# Patient Record
Sex: FEMALE | Race: BLACK OR AFRICAN AMERICAN | NOT HISPANIC OR LATINO | Employment: UNEMPLOYED | URBAN - METROPOLITAN AREA
[De-identification: names, ages, dates, MRNs, and addresses within clinical notes are randomized per-mention and may not be internally consistent; named-entity substitution may affect disease eponyms.]

---

## 2023-10-19 PROBLEM — F32.A DEPRESSION: Status: ACTIVE | Noted: 2023-10-19

## 2023-10-19 PROBLEM — F41.9 ANXIETY: Status: ACTIVE | Noted: 2023-10-19

## 2023-10-19 PROBLEM — R32 INCONTINENCE: Status: ACTIVE | Noted: 2023-10-19

## 2023-10-19 PROBLEM — E55.9 VITAMIN D DEFICIENCY: Status: ACTIVE | Noted: 2023-10-19

## 2023-10-19 PROBLEM — E66.9 OBESITY: Status: ACTIVE | Noted: 2023-10-19

## 2023-10-19 PROBLEM — F32.1: Status: ACTIVE | Noted: 2023-10-19

## 2023-10-19 PROBLEM — L81.9 HYPERPIGMENTED SKIN LESION: Status: ACTIVE | Noted: 2023-10-19

## 2023-10-19 RX ORDER — POLYETHYLENE GLYCOL 3350 17 G/17G
17 POWDER, FOR SOLUTION ORAL DAILY
COMMUNITY
Start: 2022-09-29 | End: 2024-02-26 | Stop reason: WASHOUT

## 2023-10-19 RX ORDER — PRENATAL VIT 49/IRON FUM/FOLIC 6.75-0.2MG
TABLET ORAL
COMMUNITY
End: 2024-02-26 | Stop reason: WASHOUT

## 2023-10-19 RX ORDER — CEFTRIAXONE 500 MG/1
INJECTION, POWDER, FOR SOLUTION INTRAMUSCULAR; INTRAVENOUS
COMMUNITY
Start: 2022-10-31 | End: 2024-02-26 | Stop reason: WASHOUT

## 2023-10-19 RX ORDER — AZITHROMYCIN 500 MG/1
2 TABLET, FILM COATED ORAL
COMMUNITY
Start: 2022-10-28 | End: 2024-02-26 | Stop reason: WASHOUT

## 2024-02-04 ENCOUNTER — APPOINTMENT (OUTPATIENT)
Dept: RADIOLOGY | Facility: HOSPITAL | Age: 24
End: 2024-02-04
Payer: COMMERCIAL

## 2024-02-04 ENCOUNTER — HOSPITAL ENCOUNTER (EMERGENCY)
Facility: HOSPITAL | Age: 24
Discharge: HOME | End: 2024-02-04
Payer: COMMERCIAL

## 2024-02-04 VITALS
TEMPERATURE: 98.5 F | WEIGHT: 200 LBS | HEIGHT: 63 IN | BODY MASS INDEX: 35.44 KG/M2 | DIASTOLIC BLOOD PRESSURE: 76 MMHG | OXYGEN SATURATION: 98 % | RESPIRATION RATE: 18 BRPM | SYSTOLIC BLOOD PRESSURE: 122 MMHG | HEART RATE: 81 BPM

## 2024-02-04 DIAGNOSIS — N76.0 BV (BACTERIAL VAGINOSIS): ICD-10-CM

## 2024-02-04 DIAGNOSIS — B96.89 BV (BACTERIAL VAGINOSIS): ICD-10-CM

## 2024-02-04 DIAGNOSIS — Z3A.01 LESS THAN 8 WEEKS GESTATION OF PREGNANCY (HHS-HCC): Primary | ICD-10-CM

## 2024-02-04 LAB
ABO GROUP (TYPE) IN BLOOD: NORMAL
ALBUMIN SERPL BCP-MCNC: 4.2 G/DL (ref 3.4–5)
ALP SERPL-CCNC: 68 U/L (ref 33–110)
ALT SERPL W P-5'-P-CCNC: 16 U/L (ref 7–45)
ANION GAP SERPL CALC-SCNC: 12 MMOL/L (ref 10–20)
ANTIBODY SCREEN: NORMAL
APPEARANCE UR: CLEAR
AST SERPL W P-5'-P-CCNC: 14 U/L (ref 9–39)
B-HCG SERPL-ACNC: 3174 MIU/ML
BASOPHILS # BLD AUTO: 0.04 X10*3/UL (ref 0–0.1)
BASOPHILS NFR BLD AUTO: 0.5 %
BILIRUB SERPL-MCNC: 0.6 MG/DL (ref 0–1.2)
BILIRUB UR STRIP.AUTO-MCNC: NEGATIVE MG/DL
BUN SERPL-MCNC: 7 MG/DL (ref 6–23)
CALCIUM SERPL-MCNC: 9.8 MG/DL (ref 8.6–10.6)
CHLORIDE SERPL-SCNC: 104 MMOL/L (ref 98–107)
CLUE CELLS SPEC QL WET PREP: PRESENT
CO2 SERPL-SCNC: 24 MMOL/L (ref 21–32)
COLOR UR: YELLOW
CREAT SERPL-MCNC: 0.83 MG/DL (ref 0.5–1.05)
EGFRCR SERPLBLD CKD-EPI 2021: >90 ML/MIN/1.73M*2
EOSINOPHIL # BLD AUTO: 0.23 X10*3/UL (ref 0–0.7)
EOSINOPHIL NFR BLD AUTO: 3 %
ERYTHROCYTE [DISTWIDTH] IN BLOOD BY AUTOMATED COUNT: 14.6 % (ref 11.5–14.5)
GLUCOSE SERPL-MCNC: 98 MG/DL (ref 74–99)
GLUCOSE UR STRIP.AUTO-MCNC: NEGATIVE MG/DL
HCT VFR BLD AUTO: 37.4 % (ref 36–46)
HGB BLD-MCNC: 12.6 G/DL (ref 12–16)
IMM GRANULOCYTES # BLD AUTO: 0.03 X10*3/UL (ref 0–0.7)
IMM GRANULOCYTES NFR BLD AUTO: 0.4 % (ref 0–0.9)
KETONES UR STRIP.AUTO-MCNC: ABNORMAL MG/DL
LEUKOCYTE ESTERASE UR QL STRIP.AUTO: ABNORMAL
LYMPHOCYTES # BLD AUTO: 3.37 X10*3/UL (ref 1.2–4.8)
LYMPHOCYTES NFR BLD AUTO: 44.1 %
MCH RBC QN AUTO: 26 PG (ref 26–34)
MCHC RBC AUTO-ENTMCNC: 33.7 G/DL (ref 32–36)
MCV RBC AUTO: 77 FL (ref 80–100)
MONOCYTES # BLD AUTO: 0.52 X10*3/UL (ref 0.1–1)
MONOCYTES NFR BLD AUTO: 6.8 %
MUCOUS THREADS #/AREA URNS AUTO: NORMAL /LPF
NEUTROPHILS # BLD AUTO: 3.45 X10*3/UL (ref 1.2–7.7)
NEUTROPHILS NFR BLD AUTO: 45.2 %
NITRITE UR QL STRIP.AUTO: NEGATIVE
NRBC BLD-RTO: 0 /100 WBCS (ref 0–0)
PH UR STRIP.AUTO: 5 [PH]
PLATELET # BLD AUTO: 358 X10*3/UL (ref 150–450)
POTASSIUM SERPL-SCNC: 3.6 MMOL/L (ref 3.5–5.3)
PREGNANCY TEST URINE, POC: POSITIVE
PROT SERPL-MCNC: 7.4 G/DL (ref 6.4–8.2)
PROT UR STRIP.AUTO-MCNC: NEGATIVE MG/DL
RBC # BLD AUTO: 4.84 X10*6/UL (ref 4–5.2)
RBC # UR STRIP.AUTO: NEGATIVE /UL
RBC #/AREA URNS AUTO: NORMAL /HPF
RH FACTOR (ANTIGEN D): NORMAL
SODIUM SERPL-SCNC: 136 MMOL/L (ref 136–145)
SP GR UR STRIP.AUTO: 1.02
SQUAMOUS #/AREA URNS AUTO: NORMAL /HPF
T VAGINALIS SPEC QL WET PREP: ABNORMAL
TRICHOMONAS REFLEX COMMENT: ABNORMAL
UROBILINOGEN UR STRIP.AUTO-MCNC: 2 MG/DL
WBC # BLD AUTO: 7.6 X10*3/UL (ref 4.4–11.3)
WBC #/AREA URNS AUTO: NORMAL /HPF
WBC VAG QL WET PREP: ABNORMAL
YEAST VAG QL WET PREP: ABNORMAL

## 2024-02-04 PROCEDURE — 87800 DETECT AGNT MULT DNA DIREC: CPT | Performed by: PHYSICIAN ASSISTANT

## 2024-02-04 PROCEDURE — 99284 EMERGENCY DEPT VISIT MOD MDM: CPT | Mod: 25

## 2024-02-04 PROCEDURE — 85025 COMPLETE CBC W/AUTO DIFF WBC: CPT | Performed by: PHYSICIAN ASSISTANT

## 2024-02-04 PROCEDURE — 81001 URINALYSIS AUTO W/SCOPE: CPT | Performed by: PHYSICIAN ASSISTANT

## 2024-02-04 PROCEDURE — 80053 COMPREHEN METABOLIC PANEL: CPT | Performed by: PHYSICIAN ASSISTANT

## 2024-02-04 PROCEDURE — 87210 SMEAR WET MOUNT SALINE/INK: CPT | Mod: 59 | Performed by: PHYSICIAN ASSISTANT

## 2024-02-04 PROCEDURE — 86901 BLOOD TYPING SEROLOGIC RH(D): CPT | Performed by: PHYSICIAN ASSISTANT

## 2024-02-04 PROCEDURE — 84702 CHORIONIC GONADOTROPIN TEST: CPT | Performed by: PHYSICIAN ASSISTANT

## 2024-02-04 PROCEDURE — 87661 TRICHOMONAS VAGINALIS AMPLIF: CPT | Mod: 59 | Performed by: PHYSICIAN ASSISTANT

## 2024-02-04 PROCEDURE — 87086 URINE CULTURE/COLONY COUNT: CPT | Performed by: PHYSICIAN ASSISTANT

## 2024-02-04 PROCEDURE — 99285 EMERGENCY DEPT VISIT HI MDM: CPT | Performed by: PHYSICIAN ASSISTANT

## 2024-02-04 PROCEDURE — 36415 COLL VENOUS BLD VENIPUNCTURE: CPT | Performed by: PHYSICIAN ASSISTANT

## 2024-02-04 PROCEDURE — 76830 TRANSVAGINAL US NON-OB: CPT

## 2024-02-04 RX ORDER — ACETAMINOPHEN 325 MG/1
650 TABLET ORAL EVERY 6 HOURS PRN
Qty: 20 TABLET | Refills: 0 | Status: SHIPPED | OUTPATIENT
Start: 2024-02-04 | End: 2024-02-09

## 2024-02-04 RX ORDER — B-COMPLEX WITH VITAMIN C
1 TABLET ORAL DAILY
Qty: 30 TABLET | Refills: 0 | Status: SHIPPED | OUTPATIENT
Start: 2024-02-04 | End: 2024-03-05

## 2024-02-04 RX ORDER — ACETAMINOPHEN 325 MG/1
975 TABLET ORAL ONCE
Status: COMPLETED | OUTPATIENT
Start: 2024-02-04 | End: 2024-02-04

## 2024-02-04 RX ORDER — METRONIDAZOLE 500 MG/1
500 TABLET ORAL 2 TIMES DAILY
Qty: 14 TABLET | Refills: 0 | Status: SHIPPED | OUTPATIENT
Start: 2024-02-04 | End: 2024-02-11

## 2024-02-04 RX ADMIN — ACETAMINOPHEN 975 MG: 325 TABLET ORAL at 16:24

## 2024-02-04 ASSESSMENT — COLUMBIA-SUICIDE SEVERITY RATING SCALE - C-SSRS
1. IN THE PAST MONTH, HAVE YOU WISHED YOU WERE DEAD OR WISHED YOU COULD GO TO SLEEP AND NOT WAKE UP?: NO
6. HAVE YOU EVER DONE ANYTHING, STARTED TO DO ANYTHING, OR PREPARED TO DO ANYTHING TO END YOUR LIFE?: NO
2. HAVE YOU ACTUALLY HAD ANY THOUGHTS OF KILLING YOURSELF?: NO

## 2024-02-04 ASSESSMENT — PAIN - FUNCTIONAL ASSESSMENT: PAIN_FUNCTIONAL_ASSESSMENT: 0-10

## 2024-02-04 ASSESSMENT — PAIN DESCRIPTION - LOCATION: LOCATION: ABDOMEN

## 2024-02-04 NOTE — ED PROVIDER NOTES
"This is a 23-year-old pregnant female who presents to the ED with flank pain in pregnancy has been present for the past 2 to 3 days.  She states that she took a pregnancy test on 1/30 and found to be positive, she states that later on she developed flank pain as well as dysuria, urinary frequency and urgency.  She also endorses having some vaginal burning.  She denies any vaginal discharge or bleeding.  She denies any abdominal pain, nausea, vomiting, chest pain, shortness of breath, fevers or chills.  She states otherwise she has been in her normal state of health.  She has not yet seen OB/GYN for this pregnancy and has not yet had an ultrasound.  She is unsure of the exact date of her last menstrual cycle but believes it was in December.  She did not take anything at home for her symptoms.  She is G2, P1.      History provided by:  Patient   used: No             Visit Vitals  /76   Pulse 81   Temp 36.9 °C (98.5 °F)   Resp 18   Ht 1.6 m (5' 3\")   Wt 90.7 kg (200 lb)   SpO2 98%   BMI 35.43 kg/m²   BSA 2.01 m²          Physical Exam     Physical exam:    General: Vitals noted, no distress. Afebrile.    EENT: PERRL, EOM's intact. Eye lids without lesions. No scleral icterus. Normal phonation. Nares patent. MMM.     Cardiac: Regular, rate, rhythm, no murmur.    Pulmonary: Lungs clear bilaterally with good aeration. No adventitious breath sounds.    Abdomen: Nontender, soft, nonsurgical. No peritoneal signs. Normoactive bowel sounds.  Mild right-sided CVA tenderness to palpation.  No left-sided CVA tenderness.    Pelvic Exam: Chaperone present. External genitalia normal. No rashes or lesions. Speculum exam shows no lesions on the cervix.  Small amount of thick white discharge within the vagina. no bleeding. Closed cervical os. Bimanual exam shows no adnexal pain or mass. No cervical motion tenderness.    Extremities: No peripheral edema. Full range of motion. Moves all extremities freely. "     Skin: No rash. Warm and dry. No discoloration noted.     Neuro: No focal neurologic deficits noted.  Pt is A&O x3, speech is clear, moves all extremities independently sensation is intact.          Labs Reviewed - No data to display    No orders to display         ED Course & MDM     Medical Decision Making  This is a 23-year-old pregnant female who presents to the ED with flank pain in pregnancy with associated urinary symptoms.  Vital stable upon arrival to the ED.  On physical examination she is overall well-appearing.  Abdomen soft and nontender.  She did have mild right-sided CVA tenderness.  No rashes or lesions to her flanks.  Pelvic examination performed chaperone present which showed a small amount of thick white discharge within the vagina.  No cervical motion tenderness.  No adnexal pain or masses palpated.  Wet prep as well as GC and chlamydia swabs obtained during pelvic examination.  IV established laboratory studies obtained.  Urinalysis ordered.  Urine pregnancy test was positive today.  Transvaginal ultrasound ordered to evaluate location and age of the pregnancy.  She was medicated Tylenol for her pain.  On reevaluation she was feeling improved.  Urinalysis positive for leukocyte Estrace but was otherwise grossly unremarkable and not concerning for UTI.  Urine was sent for culture.  Wet prep positive for clue cells.  Beta quant 3174, CMP and CBC both grossly unremarkable.  She is Rh+ so she does not require RhoGAM today.  Transvaginal ultrasound did show a IUP consistent with 5 weeks 2 days, and no fetal cardiac activity noted, however this could be due to early gestational age.  There was also an area that was concerning for possible small subchorionic hemorrhage.  I discussed the results with the patient.  She was advised to follow-up with OB/GYN within the next 2 weeks.  She was given signs and symptoms that she should return to the ED with.  She was feeling improved at this time and was  given prescriptions for Tylenol for her pain, prenatal vitamins for the pregnancy as well as Flagyl for the bacterial vaginosis.  She was provided with OB/GYN clinic follow-up instructions and was discharged from the ED in stable condition.    Amount and/or Complexity of Data Reviewed  Labs: ordered.  Radiology: ordered. Decision-making details documented in ED Course.    Risk  Prescription drug management.         ED Course as of 02/04/24 1747   Sun Feb 04, 2024   1731 US pelvis transvaginal  On my interpretation IUP is visualized within the uterus [AW]      ED Course User Index  [AW] Chey Humphries PA-C         Diagnoses as of 02/04/24 1747   Less than 8 weeks gestation of pregnancy   BV (bacterial vaginosis)       Procedures    ANNETTE Kent, BRANDYN Humphries PA-C  02/04/24 1749

## 2024-02-05 LAB
BACTERIA UR CULT: NORMAL
C TRACH RRNA SPEC QL NAA+PROBE: POSITIVE
HOLD SPECIMEN: NORMAL
N GONORRHOEA DNA SPEC QL PROBE+SIG AMP: NEGATIVE
T VAGINALIS RRNA SPEC QL NAA+PROBE: NEGATIVE

## 2024-02-25 PROBLEM — A74.9 CHLAMYDIA: Status: ACTIVE | Noted: 2024-02-25

## 2024-02-25 PROBLEM — F32.1: Status: RESOLVED | Noted: 2023-10-19 | Resolved: 2024-02-25

## 2024-02-25 PROBLEM — L81.9 HYPERPIGMENTED SKIN LESION: Status: RESOLVED | Noted: 2023-10-19 | Resolved: 2024-02-25

## 2024-02-25 NOTE — PROGRESS NOTES
Subjective   Della Oconnell is a 23 y.o.  at 8w3d with a working estimated date of delivery of 10/4/2024, by 5w gestational sca Ultrasound who presents for an initial prenatal visit.     Patient currently experiencing:  nausea, eating helps, declines medication at this time   Bleeding or cramping since LMP: no    Ultrasound completed this pregnancy: Yes - 24    Taking prenatal vitamin: No, would like gummy's     Last pap: 22 WNL at [facility]    Postpartum Depression: Not on file        OB History    Para Term  AB Living   3 1 1   1 1   SAB IAB Ectopic Multiple Live Births   1       1      # Outcome Date GA Lbr Michele/2nd Weight Sex Delivery Anes PTL Lv   3 Current            2 Term 23 38w5d  2.495 kg F Vag-Spont EPI  TORRES      Birth Comments: 1.3LPPH requiring IV TXA x1, IM hemabate x1, buccal cytotec x1 as well as Reyna      Complications: Hemorrhage, Preeclampsia   1 SAB              Prior pregnancy complications:  PPH, pre-e with SF  History of hypertension:  Yes, preeclampsia    Past Medical History:   Diagnosis Date    Chlamydia     History of pre-eclampsia     Hx of gonorrhea       History reviewed. No pertinent surgical history.   Social History     Tobacco Use    Smoking status: Former     Types: Cigars    Smokeless tobacco: Never   Vaping Use    Vaping Use: Former   Substance Use Topics    Alcohol use: Not Currently    Drug use: Not Currently     Types: Marijuana        Objective   Physical Exam  Weight: 101 kg (223 lb 3.2 oz)  Pregravid BMI: 37.21  BP: 136/76 (Pluse-69)    Physical Exam  Constitutional:       Appearance: Normal appearance.   HENT:      Head: Normocephalic.   Cardiovascular:      Rate and Rhythm: Normal rate and regular rhythm.      Pulses: Normal pulses.      Heart sounds: Normal heart sounds.   Pulmonary:      Effort: Pulmonary effort is normal.      Breath sounds: Normal breath sounds.   Skin:     General: Skin is warm and dry.   Neurological:      Mental Status: She is  alert.   Psychiatric:         Mood and Affect: Mood normal.         Behavior: Behavior normal.         Thought Content: Thought content normal.         Judgment: Judgment normal.         Problem List Items Addressed This Visit       Chlamydia    Overview     +2/4/24 ; patient reports ED didn't tell/treat her   Treated at new OB, EPT treatment sent as well   RADHA next visit           Relevant Medications    azithromycin (Zithromax) 500 mg tablet    Supervision of other normal pregnancy, antepartum - Primary    Overview     Desired provider in labor: [] CNM  [] Physician  [] Dated by:   [x] Initial BMI: 37  [] Prenatal Labs:   [] Rh status:  [] Genetic Screening:    [] Baby ASA    [] Anatomy US:  [] Fetal Sex:   [] Patient added to BirthTracks  [] 1hr GCT at 24-28wks:  [] 3 hr GTT (if indicated):  [] Rhogam (if indicated):   [] Fetal Surveillance (if indicated):    [] Tdap (27-36wks):  [] Flu Shot:  [] COVID vaccine:     [] Breastfeeding:  [] Pain management during labor:   [] Postpartum Birth control method:   [] Labor Support:   [] GBS at 36 wks:               Relevant Medications    azithromycin (Zithromax) 500 mg tablet    PNV62-FA-om3-dha-epa-fish oil (Prenatal Gummy) 400 mcg-35 mg -25 mg-5 mg tablet,chewable    Other Relevant Orders    Comprehensive Metabolic Panel    Protein, Urine Random    Uric Acid    History of pre-eclampsia    Overview     With SF with 2023 birth  HELLP labs ordered at new OB  [ ] ASA 81mg at 12w          BMI 37.0-37.9, adult    Overview     BMI = 37.21 at NOB  Fetal surveillance BMI 35-39.9 at NOB:  Growth US at 30 and 36 wks  BPP or NST weekly 37 wks to delivery    Timing of delivery: 39 0/7 - 39 6/7 wks  *BMI >= 50 at any time in pregnancy: Deliver at Level 4            Other Visit Diagnoses       8 weeks gestation of pregnancy                Plan   NOB plan: New OB resources provided and reviewed with particular attention to dietary, travel, and medication restrictions  Oriented to  practice, CNM vs. MD care  Reviewed IOM recommendations for weight gain given pt's BMI: 11-20 pounds (BMI greater than or equal to 30)  Reviewed bleeding precautions, warning signs, when to call provider; phone number provided  Discussed bASA for PEC prophylaxis  The following Rx were sent to pharmacy: PNV,    Routine NOB labs ordered  Additional labs added: JONATHAN  Discussed Centering Pregnancy with patient, interested, enrolled in [facility] group, [phone]  Dating ultrasound ordered  Return in 4 weeks for routine prenatal care  Urine culture sent as part of labs for asymptomatic screening only   Reviewed reasons to call CNM on-call: vaginal bleeding, strong pelvic pain, or any questions/concerns  *next visit: genetics, ASA, flu, mood     CHANTALE Donahue

## 2024-02-26 ENCOUNTER — INITIAL PRENATAL (OUTPATIENT)
Dept: OBSTETRICS AND GYNECOLOGY | Facility: CLINIC | Age: 24
End: 2024-02-26
Payer: COMMERCIAL

## 2024-02-26 ENCOUNTER — LAB (OUTPATIENT)
Dept: LAB | Facility: LAB | Age: 24
End: 2024-02-26
Payer: COMMERCIAL

## 2024-02-26 VITALS — WEIGHT: 223.2 LBS | DIASTOLIC BLOOD PRESSURE: 76 MMHG | SYSTOLIC BLOOD PRESSURE: 136 MMHG | BODY MASS INDEX: 39.54 KG/M2

## 2024-02-26 DIAGNOSIS — Z34.80 SUPERVISION OF OTHER NORMAL PREGNANCY, ANTEPARTUM (HHS-HCC): ICD-10-CM

## 2024-02-26 DIAGNOSIS — Z3A.08 8 WEEKS GESTATION OF PREGNANCY (HHS-HCC): ICD-10-CM

## 2024-02-26 DIAGNOSIS — Z87.59 HISTORY OF PRE-ECLAMPSIA: ICD-10-CM

## 2024-02-26 DIAGNOSIS — Z34.00 SUPERVISION OF NORMAL FIRST PREGNANCY, ANTEPARTUM (HHS-HCC): ICD-10-CM

## 2024-02-26 DIAGNOSIS — A74.9 CHLAMYDIA: ICD-10-CM

## 2024-02-26 DIAGNOSIS — Z34.80 SUPERVISION OF OTHER NORMAL PREGNANCY, ANTEPARTUM (HHS-HCC): Primary | ICD-10-CM

## 2024-02-26 PROBLEM — E66.9 OBESITY: Status: RESOLVED | Noted: 2023-10-19 | Resolved: 2024-02-26

## 2024-02-26 PROBLEM — R32 INCONTINENCE: Status: RESOLVED | Noted: 2023-10-19 | Resolved: 2024-02-26

## 2024-02-26 PROCEDURE — 87340 HEPATITIS B SURFACE AG IA: CPT

## 2024-02-26 PROCEDURE — 99214 OFFICE O/P EST MOD 30 MIN: CPT | Performed by: ADVANCED PRACTICE MIDWIFE

## 2024-02-26 PROCEDURE — 80053 COMPREHEN METABOLIC PANEL: CPT

## 2024-02-26 PROCEDURE — 87086 URINE CULTURE/COLONY COUNT: CPT | Performed by: ADVANCED PRACTICE MIDWIFE

## 2024-02-26 PROCEDURE — 86787 VARICELLA-ZOSTER ANTIBODY: CPT

## 2024-02-26 PROCEDURE — 85027 COMPLETE CBC AUTOMATED: CPT

## 2024-02-26 PROCEDURE — 86317 IMMUNOASSAY INFECTIOUS AGENT: CPT

## 2024-02-26 PROCEDURE — 86901 BLOOD TYPING SEROLOGIC RH(D): CPT

## 2024-02-26 PROCEDURE — 87800 DETECT AGNT MULT DNA DIREC: CPT | Performed by: ADVANCED PRACTICE MIDWIFE

## 2024-02-26 PROCEDURE — 86900 BLOOD TYPING SEROLOGIC ABO: CPT

## 2024-02-26 PROCEDURE — 83020 HEMOGLOBIN ELECTROPHORESIS: CPT | Performed by: ADVANCED PRACTICE MIDWIFE

## 2024-02-26 PROCEDURE — 86780 TREPONEMA PALLIDUM: CPT

## 2024-02-26 PROCEDURE — 86803 HEPATITIS C AB TEST: CPT

## 2024-02-26 PROCEDURE — 84550 ASSAY OF BLOOD/URIC ACID: CPT

## 2024-02-26 PROCEDURE — 86850 RBC ANTIBODY SCREEN: CPT

## 2024-02-26 PROCEDURE — 83021 HEMOGLOBIN CHROMOTOGRAPHY: CPT

## 2024-02-26 PROCEDURE — 83036 HEMOGLOBIN GLYCOSYLATED A1C: CPT

## 2024-02-26 PROCEDURE — 87389 HIV-1 AG W/HIV-1&-2 AB AG IA: CPT

## 2024-02-26 PROCEDURE — 87661 TRICHOMONAS VAGINALIS AMPLIF: CPT | Mod: 59 | Performed by: ADVANCED PRACTICE MIDWIFE

## 2024-02-26 PROCEDURE — 36415 COLL VENOUS BLD VENIPUNCTURE: CPT

## 2024-02-26 RX ORDER — AZITHROMYCIN 500 MG/1
1000 TABLET, FILM COATED ORAL ONCE
Qty: 2 TABLET | Refills: 0 | Status: SHIPPED | OUTPATIENT
Start: 2024-02-26 | End: 2024-02-26

## 2024-02-26 ASSESSMENT — ENCOUNTER SYMPTOMS
NEUROLOGICAL NEGATIVE: 0
CARDIOVASCULAR NEGATIVE: 0
ALLERGIC/IMMUNOLOGIC NEGATIVE: 0
GASTROINTESTINAL NEGATIVE: 0
ENDOCRINE NEGATIVE: 0
HEMATOLOGIC/LYMPHATIC NEGATIVE: 0
CONSTITUTIONAL NEGATIVE: 0
RESPIRATORY NEGATIVE: 0
EYES NEGATIVE: 0
MUSCULOSKELETAL NEGATIVE: 0
PSYCHIATRIC NEGATIVE: 0

## 2024-02-27 LAB
ABO GROUP (TYPE) IN BLOOD: NORMAL
ALBUMIN SERPL BCP-MCNC: 4.2 G/DL (ref 3.4–5)
ALP SERPL-CCNC: 65 U/L (ref 33–110)
ALT SERPL W P-5'-P-CCNC: 17 U/L (ref 7–45)
ANION GAP SERPL CALC-SCNC: 12 MMOL/L (ref 10–20)
ANTIBODY SCREEN: NORMAL
AST SERPL W P-5'-P-CCNC: 11 U/L (ref 9–39)
BACTERIA UR CULT: NO GROWTH
BILIRUB SERPL-MCNC: 0.4 MG/DL (ref 0–1.2)
BUN SERPL-MCNC: 10 MG/DL (ref 6–23)
C TRACH RRNA SPEC QL NAA+PROBE: POSITIVE
CALCIUM SERPL-MCNC: 9.4 MG/DL (ref 8.6–10.6)
CHLORIDE SERPL-SCNC: 104 MMOL/L (ref 98–107)
CO2 SERPL-SCNC: 23 MMOL/L (ref 21–32)
CREAT SERPL-MCNC: 0.68 MG/DL (ref 0.5–1.05)
EGFRCR SERPLBLD CKD-EPI 2021: >90 ML/MIN/1.73M*2
ERYTHROCYTE [DISTWIDTH] IN BLOOD BY AUTOMATED COUNT: 15.7 % (ref 11.5–14.5)
EST. AVERAGE GLUCOSE BLD GHB EST-MCNC: 103 MG/DL
GLUCOSE SERPL-MCNC: 78 MG/DL (ref 74–99)
HBA1C MFR BLD: 5.2 %
HBV SURFACE AG SERPL QL IA: NONREACTIVE
HCT VFR BLD AUTO: 39.5 % (ref 36–46)
HCV AB SER QL: NONREACTIVE
HGB BLD-MCNC: 12.3 G/DL (ref 12–16)
HIV 1+2 AB+HIV1 P24 AG SERPL QL IA: NONREACTIVE
MCH RBC QN AUTO: 25.7 PG (ref 26–34)
MCHC RBC AUTO-ENTMCNC: 31.1 G/DL (ref 32–36)
MCV RBC AUTO: 83 FL (ref 80–100)
N GONORRHOEA DNA SPEC QL PROBE+SIG AMP: NEGATIVE
NRBC BLD-RTO: 0 /100 WBCS (ref 0–0)
PLATELET # BLD AUTO: 330 X10*3/UL (ref 150–450)
POTASSIUM SERPL-SCNC: 4.2 MMOL/L (ref 3.5–5.3)
PROT SERPL-MCNC: 6.8 G/DL (ref 6.4–8.2)
RBC # BLD AUTO: 4.79 X10*6/UL (ref 4–5.2)
REFLEX ADDED, ANEMIA PANEL: NORMAL
RH FACTOR (ANTIGEN D): NORMAL
RUBV IGG SERPL IA-ACNC: 3.3 IA
RUBV IGG SERPL QL IA: POSITIVE
SODIUM SERPL-SCNC: 135 MMOL/L (ref 136–145)
T VAGINALIS RRNA SPEC QL NAA+PROBE: NEGATIVE
TREPONEMA PALLIDUM IGG+IGM AB [PRESENCE] IN SERUM OR PLASMA BY IMMUNOASSAY: NONREACTIVE
URATE SERPL-MCNC: 4.2 MG/DL (ref 2.3–6.7)
VARICELLA ZOSTER IGG INDEX: 1.6 IA
VZV IGG SER QL IA: POSITIVE
WBC # BLD AUTO: 8.4 X10*3/UL (ref 4.4–11.3)

## 2024-02-28 ENCOUNTER — TELEPHONE (OUTPATIENT)
Dept: OBSTETRICS AND GYNECOLOGY | Facility: CLINIC | Age: 24
End: 2024-02-28

## 2024-02-28 LAB
HEMOGLOBIN A2: 2.8 % (ref 2–3.5)
HEMOGLOBIN A: 96.8 % (ref 95.8–98)
HEMOGLOBIN F: 0.4 % (ref 0–2)
HEMOGLOBIN IDENTIFICATION INTERPRETATION: NORMAL
PATH REVIEW-HGB IDENTIFICATION: NORMAL

## 2024-03-21 NOTE — PROGRESS NOTES
Subjective   Della Oconnell is a 23 y.o.  at 13w3d with a working estimated date of delivery of 10/4/2024, by Ultrasound who presents for a routine prenatal visit.     She denies vaginal bleeding, abdominal pain, or leakage of fluid. Patient denies vision changes. Patient has irregular daily headaches. She lays down and the headaches go away. Moodwise patient doing fine.     Just came from US. She reports dating was adjusted (report not visible yet)    Objective   Physical Exam  Weight: 102 kg (224 lb 12.8 oz), Pregravid BMI: 37.21  Expected Total Weight Gain: 5 kg (11 lb)-9 kg (19 lb)   BP: 144/85 (Pluse-87)     Repeat 123    Problem List Items Addressed This Visit       Chlamydia    Overview     +24 ; patient reports ED didn't tell/treat her   + at new OB   Treated at new OB, EPT treatment sent as well   RADHA :            Supervision of other normal pregnancy, antepartum    Overview       Desired provider in labor: [] CNM  [] Physician  [] Dated by:   [x] Initial BMI: 37  [x] Prenatal Labs: WNL except for +chlamydia  [x] Rh status: B+  [x] Genetic Screening:  ordered 24  [x] Baby ASA: rx sent at 13w    [] Anatomy US:  [] Fetal Sex:   [] Patient added to BirthTracks  [] 1hr GCT at 24-28wks:  [] 3 hr GTT (if indicated):  [] Fetal Surveillance (if indicated):    [] Tdap (27-36wks):  [x] Flu Shot: declines 24  [] COVID vaccine:     [] Breastfeeding:  [] Pain management during labor:   [] Postpartum Birth control method:   [] Labor Support:   [] GBS at 36 wks:               History of pre-eclampsia    Overview     With SF with  birth  HELLP labs WNL at new OB ; p/c collected at 13w  [ x] ASA 81mg at 12w   13w: 144/85 --> repeat 123/, reviewed with Dr Perdomo, will continue to monitor closely             Other Visit Diagnoses       13 weeks gestation of pregnancy    -  Primary    Relevant Medications    aspirin 81 mg chewable tablet    Other Relevant Orders    C. Trachomatis / N. Gonorrhoeae,  Amplified Detection    Protein, Urine Random    [facility] Prenatal Screen            -Start bASA for PEC prophylaxis  -NIPT discussed with patient: patient desires. Pre test genetic counseling discussed and included: Interpretation of family and medical histories to assess the probability of disease occurrence or recurrence; Education about inheritance, genetic testing, disease management, prevention and resources; Counseling to promote informed choices and adaptation to the risk or presented of a genetic condition; Counseling for psychological aspects of genetic testing.  -Anatomy to be scheduled  -chlamydia RADHA today  -p/c ratio today   -Reviewed reasons to call CNM on-call: vaginal bleeding, loss of fluid, severe pelvic pain, or any questions/concerns  -RTC in 4 weeks or prn  *next visit:FINA Moreno-COLE

## 2024-04-01 ENCOUNTER — HOSPITAL ENCOUNTER (OUTPATIENT)
Dept: RADIOLOGY | Facility: CLINIC | Age: 24
Discharge: HOME | End: 2024-04-01
Payer: COMMERCIAL

## 2024-04-01 ENCOUNTER — ROUTINE PRENATAL (OUTPATIENT)
Dept: OBSTETRICS AND GYNECOLOGY | Facility: CLINIC | Age: 24
End: 2024-04-01
Payer: COMMERCIAL

## 2024-04-01 VITALS — DIASTOLIC BLOOD PRESSURE: 85 MMHG | BODY MASS INDEX: 39.82 KG/M2 | WEIGHT: 224.8 LBS | SYSTOLIC BLOOD PRESSURE: 123 MMHG

## 2024-04-01 DIAGNOSIS — Z87.59 HISTORY OF PRE-ECLAMPSIA: ICD-10-CM

## 2024-04-01 DIAGNOSIS — Z3A.13 13 WEEKS GESTATION OF PREGNANCY (HHS-HCC): Primary | ICD-10-CM

## 2024-04-01 DIAGNOSIS — Z34.00 SUPERVISION OF NORMAL FIRST PREGNANCY, ANTEPARTUM (HHS-HCC): ICD-10-CM

## 2024-04-01 DIAGNOSIS — A74.9 CHLAMYDIA: ICD-10-CM

## 2024-04-01 DIAGNOSIS — Z34.80 SUPERVISION OF OTHER NORMAL PREGNANCY, ANTEPARTUM (HHS-HCC): ICD-10-CM

## 2024-04-01 LAB
CREAT UR-MCNC: 178.3 MG/DL (ref 20–320)
PROT UR-ACNC: 17 MG/DL (ref 5–24)
PROT/CREAT UR: 0.1 MG/MG CREAT (ref 0–0.17)

## 2024-04-01 PROCEDURE — 76813 OB US NUCHAL MEAS 1 GEST: CPT

## 2024-04-01 PROCEDURE — 99213 OFFICE O/P EST LOW 20 MIN: CPT | Performed by: ADVANCED PRACTICE MIDWIFE

## 2024-04-01 PROCEDURE — 82570 ASSAY OF URINE CREATININE: CPT | Performed by: ADVANCED PRACTICE MIDWIFE

## 2024-04-01 PROCEDURE — 99213 OFFICE O/P EST LOW 20 MIN: CPT | Mod: TH,25 | Performed by: ADVANCED PRACTICE MIDWIFE

## 2024-04-01 PROCEDURE — 87800 DETECT AGNT MULT DNA DIREC: CPT | Performed by: ADVANCED PRACTICE MIDWIFE

## 2024-04-01 PROCEDURE — 76813 OB US NUCHAL MEAS 1 GEST: CPT | Performed by: OBSTETRICS & GYNECOLOGY

## 2024-04-01 RX ORDER — NAPROXEN SODIUM 220 MG/1
81 TABLET, FILM COATED ORAL NIGHTLY
Qty: 90 TABLET | Refills: 3 | Status: SHIPPED | OUTPATIENT
Start: 2024-04-01 | End: 2024-06-30

## 2024-04-01 ASSESSMENT — ENCOUNTER SYMPTOMS
CONSTITUTIONAL NEGATIVE: 0
MUSCULOSKELETAL NEGATIVE: 0
ALLERGIC/IMMUNOLOGIC NEGATIVE: 0
PSYCHIATRIC NEGATIVE: 0
CARDIOVASCULAR NEGATIVE: 0
EYES NEGATIVE: 0
GASTROINTESTINAL NEGATIVE: 0
HEMATOLOGIC/LYMPHATIC NEGATIVE: 0
ENDOCRINE NEGATIVE: 0
RESPIRATORY NEGATIVE: 0
NEUROLOGICAL NEGATIVE: 0

## 2024-04-02 ENCOUNTER — DOCUMENTATION (OUTPATIENT)
Dept: OBSTETRICS AND GYNECOLOGY | Facility: HOSPITAL | Age: 24
End: 2024-04-02
Payer: COMMERCIAL

## 2024-04-02 LAB
C TRACH RRNA SPEC QL NAA+PROBE: NEGATIVE
N GONORRHOEA DNA SPEC QL PROBE+SIG AMP: NEGATIVE

## 2024-04-03 ENCOUNTER — LAB (OUTPATIENT)
Dept: LAB | Facility: LAB | Age: 24
End: 2024-04-03
Payer: COMMERCIAL

## 2024-04-03 PROCEDURE — 36415 COLL VENOUS BLD VENIPUNCTURE: CPT

## 2024-04-15 ENCOUNTER — TELEPHONE (OUTPATIENT)
Dept: ENDOCRINOLOGY | Facility: CLINIC | Age: 24
End: 2024-04-15
Payer: COMMERCIAL

## 2024-04-15 NOTE — TELEPHONE ENCOUNTER
Roz sent to patient telling her to refer to her OBGYN, patient has never been seen by our office and should refer to the provider her ordered her lab work.    04/15/24 at 3:29 PM - Manuela Myers RN

## 2024-04-17 ENCOUNTER — TELEPHONE (OUTPATIENT)
Dept: OBSTETRICS AND GYNECOLOGY | Facility: HOSPITAL | Age: 24
End: 2024-04-17
Payer: COMMERCIAL

## 2024-04-17 LAB — SCAN RESULT: NORMAL

## 2024-04-17 NOTE — TELEPHONE ENCOUNTER
Called patient. Identity confirmed x2. Reviewed rr cf  DNA. Disclosed fetal sex. Patient wondering about next ROBV and US. Encouraged her to call and schedule 20w US and that we'd  get her future Centering appointments organized. All questions answered.

## 2024-04-24 ENCOUNTER — APPOINTMENT (OUTPATIENT)
Dept: OBSTETRICS AND GYNECOLOGY | Facility: CLINIC | Age: 24
End: 2024-04-24
Payer: COMMERCIAL

## 2024-04-29 ENCOUNTER — HOSPITAL ENCOUNTER (OUTPATIENT)
Facility: HOSPITAL | Age: 24
Discharge: HOME | End: 2024-04-29
Attending: OBSTETRICS & GYNECOLOGY | Admitting: OBSTETRICS & GYNECOLOGY
Payer: COMMERCIAL

## 2024-04-29 ENCOUNTER — HOSPITAL ENCOUNTER (OUTPATIENT)
Facility: HOSPITAL | Age: 24
End: 2024-04-29
Attending: OBSTETRICS & GYNECOLOGY | Admitting: OBSTETRICS & GYNECOLOGY
Payer: COMMERCIAL

## 2024-04-29 VITALS
RESPIRATION RATE: 16 BRPM | TEMPERATURE: 98.4 F | BODY MASS INDEX: 40.31 KG/M2 | OXYGEN SATURATION: 99 % | WEIGHT: 227.51 LBS | HEIGHT: 63 IN | DIASTOLIC BLOOD PRESSURE: 62 MMHG | HEART RATE: 100 BPM | SYSTOLIC BLOOD PRESSURE: 115 MMHG

## 2024-04-29 LAB
BILIRUBIN, POC: NEGATIVE
BLOOD URINE, POC: NEGATIVE
CLARITY, POC: CLEAR
COLOR, POC: YELLOW
GLUCOSE URINE, POC: NEGATIVE
KETONES, POC: NEGATIVE
LEUKOCYTE EST, POC: NORMAL
NITRITE, POC: NEGATIVE
PH, POC: 6
POC APPEARANCE OF BODY FLUID: NORMAL
SPECIFIC GRAVITY, POC: 1
URINE PROTEIN, POC: NEGATIVE
UROBILINOGEN, POC: 0.2

## 2024-04-29 PROCEDURE — 99214 OFFICE O/P EST MOD 30 MIN: CPT

## 2024-04-29 SDOH — SOCIAL STABILITY: SOCIAL INSECURITY: HAVE YOU HAD ANY THOUGHTS OF HARMING ANYONE ELSE?: NO

## 2024-04-29 SDOH — HEALTH STABILITY: MENTAL HEALTH: HAVE YOU USED ANY SUBSTANCES (CANABIS, COCAINE, HEROIN, HALLUCINOGENS, INHALANTS, ETC.) IN THE PAST 12 MONTHS?: NO

## 2024-04-29 SDOH — SOCIAL STABILITY: SOCIAL INSECURITY: ABUSE SCREEN: ADULT

## 2024-04-29 SDOH — HEALTH STABILITY: MENTAL HEALTH: HAVE YOU USED ANY PRESCRIPTION DRUGS OTHER THAN PRESCRIBED IN THE PAST 12 MONTHS?: NO

## 2024-04-29 SDOH — HEALTH STABILITY: MENTAL HEALTH: WERE YOU ABLE TO COMPLETE ALL THE BEHAVIORAL HEALTH SCREENINGS?: YES

## 2024-04-29 SDOH — SOCIAL STABILITY: SOCIAL INSECURITY: ARE YOU OR HAVE YOU BEEN THREATENED OR ABUSED PHYSICALLY, EMOTIONALLY, OR SEXUALLY BY ANYONE?: NO

## 2024-04-29 SDOH — SOCIAL STABILITY: SOCIAL INSECURITY: DOES ANYONE TRY TO KEEP YOU FROM HAVING/CONTACTING OTHER FRIENDS OR DOING THINGS OUTSIDE YOUR HOME?: NO

## 2024-04-29 SDOH — SOCIAL STABILITY: SOCIAL INSECURITY: HAVE YOU HAD THOUGHTS OF HARMING ANYONE ELSE?: NO

## 2024-04-29 SDOH — SOCIAL STABILITY: SOCIAL INSECURITY: ARE THERE ANY APPARENT SIGNS OF INJURIES/BEHAVIORS THAT COULD BE RELATED TO ABUSE/NEGLECT?: NO

## 2024-04-29 SDOH — SOCIAL STABILITY: SOCIAL INSECURITY: HAS ANYONE EVER THREATENED TO HURT YOUR FAMILY OR YOUR PETS?: NO

## 2024-04-29 SDOH — SOCIAL STABILITY: SOCIAL INSECURITY: VERBAL ABUSE: DENIES

## 2024-04-29 SDOH — HEALTH STABILITY: MENTAL HEALTH: WISH TO BE DEAD (PAST 1 MONTH): NO

## 2024-04-29 SDOH — HEALTH STABILITY: MENTAL HEALTH: SUICIDAL BEHAVIOR (LIFETIME): NO

## 2024-04-29 SDOH — ECONOMIC STABILITY: HOUSING INSECURITY: DO YOU FEEL UNSAFE GOING BACK TO THE PLACE WHERE YOU ARE LIVING?: NO

## 2024-04-29 SDOH — SOCIAL STABILITY: SOCIAL INSECURITY: DO YOU FEEL ANYONE HAS EXPLOITED OR TAKEN ADVANTAGE OF YOU FINANCIALLY OR OF YOUR PERSONAL PROPERTY?: NO

## 2024-04-29 SDOH — SOCIAL STABILITY: SOCIAL INSECURITY: PHYSICAL ABUSE: DENIES

## 2024-04-29 SDOH — HEALTH STABILITY: MENTAL HEALTH: NON-SPECIFIC ACTIVE SUICIDAL THOUGHTS (PAST 1 MONTH): NO

## 2024-04-29 ASSESSMENT — PATIENT HEALTH QUESTIONNAIRE - PHQ9
1. LITTLE INTEREST OR PLEASURE IN DOING THINGS: NOT AT ALL
SUM OF ALL RESPONSES TO PHQ9 QUESTIONS 1 & 2: 0
2. FEELING DOWN, DEPRESSED OR HOPELESS: NOT AT ALL

## 2024-04-29 ASSESSMENT — LIFESTYLE VARIABLES
AUDIT-C TOTAL SCORE: 0
AUDIT-C TOTAL SCORE: 0
SKIP TO QUESTIONS 9-10: 1
HOW OFTEN DO YOU HAVE A DRINK CONTAINING ALCOHOL: NEVER
HOW OFTEN DO YOU HAVE 6 OR MORE DRINKS ON ONE OCCASION: NEVER
HOW MANY STANDARD DRINKS CONTAINING ALCOHOL DO YOU HAVE ON A TYPICAL DAY: PATIENT DOES NOT DRINK

## 2024-04-29 ASSESSMENT — PAIN SCALES - GENERAL
PAINLEVEL_OUTOF10: 0 - NO PAIN
PAINLEVEL_OUTOF10: 0 - NO PAIN

## 2024-04-29 NOTE — H&P
Obstetrical Admission History and Physical     Della Oconnell is a 24 y.o.  RH POS at 17 and 6 by US on  follows with Anna Mariscal. Presenting for multiple medical complaints.      Chief Complaint: No chief complaint on file.    Assessment/Plan    25yo female  presents for MMC. Regarding her hypertension, she is normotensive here at 17w6d without symptoms/history c/w preeclampsia. Her n/v/d which have now resolved are consistent with food poisoning. She has no signs or symptoms of ongoing systemic illness. I have low concern for severe intraabdominal or ob cause of her resolved n/v/d.     Push PO fluids   Tolerating fluids here  Instructed to go to follow up appointments  with Anna Mariscal  Instructed to return for worsening n/v/d.    Pt seen and discussed with Dr. Kapadia    Active Problems:  There are no active Hospital Problems.      Pregnancy Problems (from 24 to present)       Problem Noted Resolved    Supervision of other normal pregnancy, antepartum ([facility]) 2024 by CHANTALE Donahue No    Priority:  Medium      Overview Addendum 2024  8:48 AM by CHANTALE Donahue       Desired provider in labor: [] CNM  [] Physician  [x] Dated by: 13w US  [x] Initial BMI: 37  [x] Prenatal Labs: WNL except for +chlamydia  [x] Rh status: B+  [x] Genetic Screening:  rr cf DNA  [x] Baby ASA: rx sent at 13w    [] Anatomy US:  [x] Fetal Sex: male  [] Patient added to BirthTracks  [] 1hr GCT at 24-28wks:  [] 3 hr GTT (if indicated):  [] Fetal Surveillance (if indicated):    [] Tdap (27-36wks):  [x] Flu Shot: declines 24  [] COVID vaccine:     [] Breastfeeding:  [] Pain management during labor:   [] Postpartum Birth control method:   [] Labor Support:   [] GBS at 36 wks:               History of pre-eclampsia 2024 by CHANTALE Donahue No    Priority:  Medium      Overview Addendum 2024  8:00 AM by Anna Mariscal  "CHANTALE     With SF with  birth  HELLP labs WNL at new OB ; p/c collected at 13w 0.1  [ x] ASA 81mg at 12w   13w: 144/85 --> repeat 123/85, reviewed with Dr Perdomo, will continue to monitor closely            History of postpartum hemorrhage 2024 by CHANTALE Donahue No    Priority:  Medium      Overview Signed 2024  3:01 PM by CHANTALE Donahue     1.3LPPH requiring IV TXA x1, IM hemabate x1, buccal cytotec x1 as well as Reyna          BMI 37.0-37.9, adult 2024 by CHANTALE Donahue No    Priority:  Medium      Overview Signed 2024  3:32 PM by CHANTALE Donahue     BMI = 37.21 at NOB  Fetal surveillance BMI 35-39.9 at NOB:  Growth US at 30 and 36 wks  BPP or NST weekly 37 wks to delivery    Timing of delivery: 39 0/7 - 39 6/7 wks  *BMI ? 50 at any time in pregnancy: Deliver at Level 4                 Subjective   Della is here complaining of mmc. Stated that her blood pressure had been high at home, 140/70's, denied HA, changes in vision, loss of consciousness. Also states that she had an episode of chills and vomiting/diarrhea last night. States that she ate 2 bowls of potato salad and 2 hours later had chills, nausea, sweating, and vomiting without blood, diarrhea without blood. She has not had diarrhea or vomiting since 0600, now stomach feels \"jumpy.\" Has been able to drink fluids since.          Obstetrical History   OB History    Para Term  AB Living   3 1 1   1 1   SAB IAB Ectopic Multiple Live Births   1       1      # Outcome Date GA Lbr Michele/2nd Weight Sex Delivery Anes PTL Lv   3 Current            2 Term 23 38w5d  2.495 kg F Vag-Spont EPI  TORRES      Birth Comments: 1.3LPPH requiring IV TXA x1, IM hemabate x1, buccal cytotec x1 as well as Reyna      Complications: Hemorrhage, Preeclampsia ([facility])   1 SAB                Past Medical History  Past Medical History:   Diagnosis Date    Chlamydia     History of " pre-eclampsia     Hx of gonorrhea         Past Surgical History   No past surgical history on file.    Social History  Social History     Tobacco Use    Smoking status: Former     Types: Cigars    Smokeless tobacco: Never   Substance Use Topics    Alcohol use: Not Currently     Substance and Sexual Activity   Drug Use Not Currently    Types: Marijuana       Allergies  Patient has no known allergies.     Medications  Medications Prior to Admission   Medication Sig Dispense Refill Last Dose    aspirin 81 mg chewable tablet Chew 1 tablet (81 mg) once daily at bedtime. Start at 12 weeks of gestation 90 tablet 3     PNV62-FA-om3-dha-epa-fish oil (Prenatal Gummy) 400 mcg-35 mg -25 mg-5 mg tablet,chewable Chew 1 tablet once daily. 90 tablet 3        Objective    Last Vitals  Temp Pulse Resp BP MAP O2 Sat                   Physical Examination  AAOx3. NAD.  RRR WAWP no delayed cap refill  CTAB  NTND NABS  's    Lab Review  Chart reviewed     Alternatives Discussed Intro (Do Not Add Period): I discussed alternative treatments to Mohs surgery and specifically discussed the risks and benefits of

## 2024-05-13 ENCOUNTER — TELEPHONE (OUTPATIENT)
Dept: OBSTETRICS AND GYNECOLOGY | Facility: CLINIC | Age: 24
End: 2024-05-13
Payer: COMMERCIAL

## 2024-05-13 NOTE — TELEPHONE ENCOUNTER
Pt was a No show for Centering called left V/M for pt to look at her mychart to see up coming paloma apt

## 2024-05-28 NOTE — PROGRESS NOTES
Subjective     Della Oconnell is a 24 y.o.  at 22w6d with a working estimated date of delivery of 10/1/2024, by Ultrasound who presents for a routine prenatal visit.     Doing well. Accompanied by . She denies vaginal bleeding, leakage of fluid, decreased fetal movements, or strong pelvic pain. Patient is constipated.     Objective   Physical Exam  Weight: 105 kg (232 lb)  Expected Total Weight Gain: 5 kg (11 lb)-9 kg (19 lb)   Pregravid BMI: 37.21  BP: 113/75 (Pluse-81)  Fetal Heart Rate: 160 Fundal Height (cm): 24 cm    Problem List Items Addressed This Visit       Supervision of other normal pregnancy, antepartum ([facility]) - Primary    Overview     Centering  Desired provider in labor: [] CNM  [] Physician  [x] Dated by: 13w US  [x] Initial BMI: 37  [x] Prenatal Labs: WNL except for +chlamydia  [x] Rh status: B+  [x] Genetic Screening:  rr cf DNA  [x] Baby ASA: rx sent at 13w    [] Anatomy US:  [x] Fetal Sex: male, yes to circ  [] Patient added to BirthTracks  [] 1hr GCT at 24-28wks:  [] 3 hr GTT (if indicated):  [] Fetal Surveillance (if indicated):    [] Tdap (27-36wks):  [x] Flu Shot: declines 24  [] COVID vaccine:     [] Breastfeeding:  [] Pain management during labor:   [] Postpartum Birth control method:   [] Labor Support:   [] GBS at 36 wks:               Relevant Medications    docusate sodium (Colace) 100 mg capsule     Other Visit Diagnoses       22 weeks gestation of pregnancy ([facility])        Constipation, unspecified constipation type                Discussed diabetes screening and routine labs, to be completed at Centering #2  Reviewed non-pharm management for constipation, colace rx sent too  Discussed importance of scheduling anatomy US asap  -Reviewed reasons to call CNM on-call: vaginal bleeding, loss of fluid, increased pelvic pain/contractions, or any questions/concerns  -RTC for Centering #1 next week or prn     FINA Donahue-COLE

## 2024-06-03 ENCOUNTER — ROUTINE PRENATAL (OUTPATIENT)
Dept: OBSTETRICS AND GYNECOLOGY | Facility: CLINIC | Age: 24
End: 2024-06-03
Payer: COMMERCIAL

## 2024-06-03 VITALS — SYSTOLIC BLOOD PRESSURE: 113 MMHG | DIASTOLIC BLOOD PRESSURE: 75 MMHG | WEIGHT: 232 LBS | BODY MASS INDEX: 41.1 KG/M2

## 2024-06-03 DIAGNOSIS — Z3A.22 22 WEEKS GESTATION OF PREGNANCY (HHS-HCC): ICD-10-CM

## 2024-06-03 DIAGNOSIS — K59.00 CONSTIPATION, UNSPECIFIED CONSTIPATION TYPE: ICD-10-CM

## 2024-06-03 DIAGNOSIS — Z34.80 SUPERVISION OF OTHER NORMAL PREGNANCY, ANTEPARTUM (HHS-HCC): Primary | ICD-10-CM

## 2024-06-03 PROCEDURE — 99213 OFFICE O/P EST LOW 20 MIN: CPT | Performed by: ADVANCED PRACTICE MIDWIFE

## 2024-06-03 PROCEDURE — 99213 OFFICE O/P EST LOW 20 MIN: CPT | Mod: TH | Performed by: ADVANCED PRACTICE MIDWIFE

## 2024-06-03 RX ORDER — DOCUSATE SODIUM 100 MG/1
100 CAPSULE, LIQUID FILLED ORAL NIGHTLY PRN
Qty: 90 CAPSULE | Refills: 1 | Status: SHIPPED | OUTPATIENT
Start: 2024-06-03

## 2024-06-03 ASSESSMENT — ENCOUNTER SYMPTOMS
NEUROLOGICAL NEGATIVE: 0
HEMATOLOGIC/LYMPHATIC NEGATIVE: 0
EYES NEGATIVE: 0
RESPIRATORY NEGATIVE: 0
ALLERGIC/IMMUNOLOGIC NEGATIVE: 0
GASTROINTESTINAL NEGATIVE: 0
CARDIOVASCULAR NEGATIVE: 0
CONSTITUTIONAL NEGATIVE: 0
ENDOCRINE NEGATIVE: 0
PSYCHIATRIC NEGATIVE: 0
MUSCULOSKELETAL NEGATIVE: 0

## 2024-06-04 ENCOUNTER — HOSPITAL ENCOUNTER (OUTPATIENT)
Dept: RADIOLOGY | Facility: CLINIC | Age: 24
Discharge: HOME | End: 2024-06-04
Payer: COMMERCIAL

## 2024-06-04 DIAGNOSIS — Z34.00 SUPERVISION OF NORMAL FIRST PREGNANCY, ANTEPARTUM (HHS-HCC): ICD-10-CM

## 2024-06-04 PROCEDURE — 76811 OB US DETAILED SNGL FETUS: CPT

## 2024-06-04 PROCEDURE — 76811 OB US DETAILED SNGL FETUS: CPT | Performed by: OBSTETRICS & GYNECOLOGY

## 2024-06-18 ENCOUNTER — HOSPITAL ENCOUNTER (OUTPATIENT)
Dept: RADIOLOGY | Facility: CLINIC | Age: 24
Discharge: HOME | End: 2024-06-18
Payer: COMMERCIAL

## 2024-06-18 DIAGNOSIS — O99.212 OBESITY AFFECTING PREGNANCY IN SECOND TRIMESTER (HHS-HCC): ICD-10-CM

## 2024-06-18 DIAGNOSIS — Z34.00 SUPERVISION OF NORMAL FIRST PREGNANCY, ANTEPARTUM (HHS-HCC): ICD-10-CM

## 2024-06-18 DIAGNOSIS — Z36.2 ENCOUNTER FOR FOLLOW-UP ULTRASOUND OF FETAL ANATOMY (HHS-HCC): ICD-10-CM

## 2024-06-18 PROCEDURE — 76816 OB US FOLLOW-UP PER FETUS: CPT

## 2024-06-18 PROCEDURE — 76816 OB US FOLLOW-UP PER FETUS: CPT | Performed by: OBSTETRICS & GYNECOLOGY

## 2024-06-19 ENCOUNTER — APPOINTMENT (OUTPATIENT)
Dept: OBSTETRICS AND GYNECOLOGY | Facility: CLINIC | Age: 24
End: 2024-06-19
Payer: COMMERCIAL

## 2024-06-26 ENCOUNTER — APPOINTMENT (OUTPATIENT)
Dept: OBSTETRICS AND GYNECOLOGY | Facility: CLINIC | Age: 24
End: 2024-06-26
Payer: COMMERCIAL

## 2024-07-17 ENCOUNTER — APPOINTMENT (OUTPATIENT)
Dept: OBSTETRICS AND GYNECOLOGY | Facility: CLINIC | Age: 24
End: 2024-07-17
Payer: COMMERCIAL

## 2024-07-23 ENCOUNTER — INITIAL PRENATAL (OUTPATIENT)
Dept: MATERNAL FETAL MEDICINE | Facility: CLINIC | Age: 24
End: 2024-07-23
Payer: COMMERCIAL

## 2024-07-23 ENCOUNTER — HOSPITAL ENCOUNTER (OUTPATIENT)
Dept: RADIOLOGY | Facility: CLINIC | Age: 24
Discharge: HOME | End: 2024-07-23
Payer: COMMERCIAL

## 2024-07-23 DIAGNOSIS — Z3A.30 30 WEEKS GESTATION OF PREGNANCY (HHS-HCC): ICD-10-CM

## 2024-07-23 DIAGNOSIS — Z34.00 SUPERVISION OF NORMAL FIRST PREGNANCY, ANTEPARTUM (HHS-HCC): ICD-10-CM

## 2024-07-23 DIAGNOSIS — O36.5990 IUGR (INTRAUTERINE GROWTH RESTRICTION) AFFECTING CARE OF MOTHER (HHS-HCC): Primary | ICD-10-CM

## 2024-07-23 DIAGNOSIS — O99.213 OBESITY COMPLICATING PREGNANCY, THIRD TRIMESTER (HHS-HCC): ICD-10-CM

## 2024-07-23 DIAGNOSIS — O36.5930 MATERNAL CARE FOR OTHER KNOWN OR SUSPECTED POOR FETAL GROWTH, THIRD TRIMESTER, NOT APPLICABLE OR UNSPECIFIED (HHS-HCC): ICD-10-CM

## 2024-07-23 PROBLEM — Z36.4 ULTRASOUND FOR ANTENATAL SCREENING FOR FETAL GROWTH RESTRICTION (HHS-HCC): Status: ACTIVE | Noted: 2024-07-23

## 2024-07-23 PROCEDURE — 76820 UMBILICAL ARTERY ECHO: CPT

## 2024-07-23 PROCEDURE — 76816 OB US FOLLOW-UP PER FETUS: CPT | Performed by: OBSTETRICS & GYNECOLOGY

## 2024-07-23 PROCEDURE — 99213 OFFICE O/P EST LOW 20 MIN: CPT | Performed by: OBSTETRICS & GYNECOLOGY

## 2024-07-23 PROCEDURE — 76819 FETAL BIOPHYS PROFIL W/O NST: CPT | Performed by: OBSTETRICS & GYNECOLOGY

## 2024-07-23 PROCEDURE — 99213 OFFICE O/P EST LOW 20 MIN: CPT | Mod: 25 | Performed by: OBSTETRICS & GYNECOLOGY

## 2024-07-23 PROCEDURE — 76816 OB US FOLLOW-UP PER FETUS: CPT

## 2024-07-23 PROCEDURE — 76819 FETAL BIOPHYS PROFIL W/O NST: CPT

## 2024-07-23 PROCEDURE — 76820 UMBILICAL ARTERY ECHO: CPT | Performed by: OBSTETRICS & GYNECOLOGY

## 2024-07-23 NOTE — PROGRESS NOTES
Sonographic Findings:  BMI 39.5 with rr cfDNA. She has not completed diabetic screening.  -Decreased interval fetal growth. The Ac is at the 7%, and the EFW is at the 6% percentile  -No malformations identified on a limited survey  -Normal amniotic fluid volume  -Normal doppler indices with an RI at the 42% percentile  The patient was informed of the above findings. All questions were addressed (see EPIC note)  Findings today are not suggestive of uteroplacental insufficiency at this time.  Repeat doppler, BPP and AFV evaluation scheduled weekly.  Thank you for allowing us to participate in the care of your patient    The chart was not completely reviewed, only issues related to the sonographic findings    Counseling Provided:  The following was discussed with the patient and her guest:  -Fetal growth restriction is a diagnosis based solely on fetal size.  However, true fetal growth restriction would be a fetus not meeting his growth potential.  As growth potential is unknown for each particular fetus, size is used as a inaccurate surrogate. She has a previous full term 5 lb 8 oz infant with a normal outcome suggesting this growth pattern may be constitutional.  -Fetal growth restriction can be caused from viral infections, genetic abnormalities including aneuploidy and single gene disorders or uteroplacental insufficiency.  The vast majority of these are normal fetuses who are small.  This is known as a fetus which is constitutionally small and outcomes are normal.  -The concept of percentiles and growth patterns were discussed.  Normal growth velocity is a strong indicator of normal outcome. This can be evaluated in 3 weeks.  -Sometimes there is evolving uteroplacental insufficiency.  The best test to determine if there is uteroplacental insufficiency is umbilical artery Doppler resistance.  This is repeated on a weekly basis.  -Uteroplacental insufficiency can be related to impending preeclampsia.    -Delivery is  usually between 37 and 39 weeks dependent on velocity of fetal growth and Doppler findings  -Delivery does not have to be at a tertiary care center unless there are other complications    The patient expressed an understanding of the above and agrees with the following plan of management:  -Weekly evaluation of biophysical profile and umbilical artery Doppler indices  -Mode of delivery is subject to usual obstetric indications  -Delivery timing will depend on subsequent evaluations.      Please contact me for any questions or concerns  Lyndsey Zimmerman M.D.  [facility]  Division of Maternal Fetal Medicine  Clinical   Dept. of Reproductive Biology, [facility]  Office phone- [phone]  Nurse coordinator Lizzy Bennett- [phone]

## 2024-07-30 ENCOUNTER — HOSPITAL ENCOUNTER (OUTPATIENT)
Dept: RADIOLOGY | Facility: CLINIC | Age: 24
Discharge: HOME | End: 2024-07-30
Payer: COMMERCIAL

## 2024-07-30 DIAGNOSIS — Z34.00 SUPERVISION OF NORMAL FIRST PREGNANCY, ANTEPARTUM (HHS-HCC): ICD-10-CM

## 2024-07-30 DIAGNOSIS — O36.5930 IUGR (INTRAUTERINE GROWTH RESTRICTION) AFFECTING CARE OF MOTHER, THIRD TRIMESTER, NOT APPLICABLE OR UNSPECIFIED FETUS (HHS-HCC): ICD-10-CM

## 2024-07-30 PROCEDURE — 76819 FETAL BIOPHYS PROFIL W/O NST: CPT | Performed by: OBSTETRICS & GYNECOLOGY

## 2024-07-30 PROCEDURE — 76815 OB US LIMITED FETUS(S): CPT

## 2024-07-30 PROCEDURE — 76819 FETAL BIOPHYS PROFIL W/O NST: CPT

## 2024-07-30 PROCEDURE — 76820 UMBILICAL ARTERY ECHO: CPT | Performed by: OBSTETRICS & GYNECOLOGY

## 2024-07-30 PROCEDURE — 76815 OB US LIMITED FETUS(S): CPT | Performed by: OBSTETRICS & GYNECOLOGY

## 2024-07-30 PROCEDURE — 76820 UMBILICAL ARTERY ECHO: CPT

## 2024-07-31 ENCOUNTER — APPOINTMENT (OUTPATIENT)
Dept: OBSTETRICS AND GYNECOLOGY | Facility: CLINIC | Age: 24
End: 2024-07-31
Payer: COMMERCIAL

## 2024-08-01 ENCOUNTER — LAB (OUTPATIENT)
Dept: LAB | Facility: LAB | Age: 24
End: 2024-08-01
Payer: COMMERCIAL

## 2024-08-01 ENCOUNTER — ROUTINE PRENATAL (OUTPATIENT)
Dept: OBSTETRICS AND GYNECOLOGY | Facility: HOSPITAL | Age: 24
End: 2024-08-01
Payer: COMMERCIAL

## 2024-08-01 VITALS — BODY MASS INDEX: 42.16 KG/M2 | WEIGHT: 238 LBS | DIASTOLIC BLOOD PRESSURE: 74 MMHG | SYSTOLIC BLOOD PRESSURE: 113 MMHG

## 2024-08-01 DIAGNOSIS — Z3A.31 31 WEEKS GESTATION OF PREGNANCY (HHS-HCC): Primary | ICD-10-CM

## 2024-08-01 DIAGNOSIS — Z34.83 PRENATAL CARE, SUBSEQUENT PREGNANCY IN THIRD TRIMESTER (HHS-HCC): ICD-10-CM

## 2024-08-01 DIAGNOSIS — Z3A.27 27 WEEKS GESTATION OF PREGNANCY (HHS-HCC): ICD-10-CM

## 2024-08-01 LAB
ERYTHROCYTE [DISTWIDTH] IN BLOOD BY AUTOMATED COUNT: 14.8 % (ref 11.5–14.5)
GLUCOSE 1H P 50 G GLC PO SERPL-MCNC: 139 MG/DL
HCT VFR BLD AUTO: 35.4 % (ref 36–46)
HGB BLD-MCNC: 11.6 G/DL (ref 12–16)
MCH RBC QN AUTO: 26.2 PG (ref 26–34)
MCHC RBC AUTO-ENTMCNC: 32.8 G/DL (ref 32–36)
MCV RBC AUTO: 80 FL (ref 80–100)
NRBC BLD-RTO: 0 /100 WBCS (ref 0–0)
PLATELET # BLD AUTO: 296 X10*3/UL (ref 150–450)
RBC # BLD AUTO: 4.42 X10*6/UL (ref 4–5.2)
REFLEX ADDED, ANEMIA PANEL: NORMAL
TREPONEMA PALLIDUM IGG+IGM AB [PRESENCE] IN SERUM OR PLASMA BY IMMUNOASSAY: NONREACTIVE
WBC # BLD AUTO: 8.4 X10*3/UL (ref 4.4–11.3)

## 2024-08-01 PROCEDURE — 99213 OFFICE O/P EST LOW 20 MIN: CPT | Mod: TH

## 2024-08-01 PROCEDURE — 85027 COMPLETE CBC AUTOMATED: CPT

## 2024-08-01 PROCEDURE — 90715 TDAP VACCINE 7 YRS/> IM: CPT

## 2024-08-01 PROCEDURE — 86780 TREPONEMA PALLIDUM: CPT

## 2024-08-01 PROCEDURE — 36415 COLL VENOUS BLD VENIPUNCTURE: CPT

## 2024-08-01 PROCEDURE — 82947 ASSAY GLUCOSE BLOOD QUANT: CPT

## 2024-08-01 PROCEDURE — 99213 OFFICE O/P EST LOW 20 MIN: CPT

## 2024-08-01 ASSESSMENT — EDINBURGH POSTNATAL DEPRESSION SCALE (EPDS)
THE THOUGHT OF HARMING MYSELF HAS OCCURRED TO ME: NEVER
I HAVE LOOKED FORWARD WITH ENJOYMENT TO THINGS: AS MUCH AS I EVER DID
I HAVE FELT SAD OR MISERABLE: NOT VERY OFTEN
I HAVE BEEN SO UNHAPPY THAT I HAVE HAD DIFFICULTY SLEEPING: NOT VERY OFTEN
I HAVE BEEN ANXIOUS OR WORRIED FOR NO GOOD REASON: HARDLY EVER
I HAVE BLAMED MYSELF UNNECESSARILY WHEN THINGS WENT WRONG: YES, SOME OF THE TIME
I HAVE BEEN ABLE TO LAUGH AND SEE THE FUNNY SIDE OF THINGS: AS MUCH AS I ALWAYS COULD
THINGS HAVE BEEN GETTING ON TOP OF ME: NO, MOST OF THE TIME I HAVE COPED QUITE WELL
TOTAL SCORE: 8
I HAVE BEEN SO UNHAPPY THAT I HAVE BEEN CRYING: ONLY OCCASIONALLY
I HAVE FELT SCARED OR PANICKY FOR NO GOOD REASON: NO, NOT MUCH

## 2024-08-01 NOTE — PROGRESS NOTES
Assessment/Plan   24 y.o.  at 31w2d  - Encouraged pt to get 1hr GCT, CBC, and Syphilis collected today, pt agreeable  - Counseled on and recommended Tdap today; pt accepts  - Birth plan reviewed and scanned into chart; pt would like her providers made aware of her history of postpartum hemorrhage requiring Reyna, and associated birth trauma she endured from that experience  - Next BPP scheduled for   - Routine prenatal care    Follow up in 2 weeks for next prenatal visit    CHANTALE Cruz    Nury Oconnell is a 24 y.o.  at 31w2d with a working estimated date of delivery of 10/1/2024, by Ultrasound presenting for a routine prenatal visit. She is doing well, no concerns. She denies vaginal bleeding, leakage of fluid, or regular contractions. She endorses good FM.    Pregnancy Problems (from 25 to present)       Problem Noted Resolved    Ultrasound for  screening for fetal growth restriction ([facility]) 2024 by CHANTALE Donahue No    Priority:  Medium      Overview Signed 2024  4:09 PM by CHANTALE Donahue     @30w: Ac is at the 7%, and the EFW is at the 6% percentile  Weekly BPP/doppler scheduled         History of pre-eclampsia 2024 by CHANTALE Donahue No    Priority:  Medium      Overview Addendum 2024  8:00 AM by CHANTALE Donahue     With SF with  birth  HELLP labs WNL at new OB ; p/c collected at 13w 0.1  [ x] ASA 81mg at 12w   13w: 144/85 --> repeat 123/85, reviewed with Dr Perdomo, will continue to monitor closely            History of postpartum hemorrhage 2024 by CHANTALE Donahue No    Priority:  Medium      Overview Signed 2024  3:01 PM by CHANTALE Donahue     1.3LPPH requiring IV TXA x1, IM hemabate x1, buccal cytotec x1 as well as Reyna          BMI 37.0-37.9, adult 2024 by CHANTALE Donahue No    Priority:  Medium       Overview Signed 2024  3:32 PM by CHANTALE Donahue     BMI = 37.21 at NOB  Fetal surveillance BMI 35-39.9 at NOB:  Growth US at 30 and 36 wks  BPP or NST weekly 37 wks to delivery    Timing of delivery: 39 0/7 - 39 6/7 wks  *BMI >= 50 at any time in pregnancy: Deliver at Level 4                 Objective   Weight: 108 kg (238 lb)  TW.7 kg (28 lb)   Expected Total Weight Gain: 5 kg (11 lb)-9 kg (19 lb)   Pregravid BMI: 37.21  Pregravid Weight: 95.3 kg (210 lb)   BP: 113/74  Fetal Heart Rate: 141 Fundal Height (cm): 32 cm

## 2024-08-02 DIAGNOSIS — R73.09 GLUCOSE TOLERANCE TEST ABNORMAL: Primary | ICD-10-CM

## 2024-08-05 ENCOUNTER — TELEPHONE (OUTPATIENT)
Dept: MATERNAL FETAL MEDICINE | Facility: HOSPITAL | Age: 24
End: 2024-08-05
Payer: COMMERCIAL

## 2024-08-05 DIAGNOSIS — O99.810 ABNORMAL GLUCOSE IN PREGNANCY, ANTEPARTUM (HHS-HCC): ICD-10-CM

## 2024-08-05 NOTE — TELEPHONE ENCOUNTER
Contacted Dash today at 31.6wga to review abnormal 1 hr gtt results. Patient identified by name and . Reviewed that 1hr glucose was higher than expected at 139, discussed normal range of less than 135. Discussed the need for diagnostic screening as soon as possible. Reviewed importance of fasting before test, and that it is a series of four blood draws. The patient verbalized understanding. She reports that she works tomorrow but will try to complete by Thursday. She denies any vaginal bleeding, vaginal loss of fluid or headaches, she endorses frequent fetal movements. All questions and concerns were addressed at time of call.  Nadia Christensen MSN, RN, CLC         ----- Message from Tosha Victoria sent at 2024  7:42 PM EDT -----  1hr resulted in 139.  Order for 3hr placed.  Please have patient complete ASAP.

## 2024-08-06 ENCOUNTER — HOSPITAL ENCOUNTER (OUTPATIENT)
Dept: RADIOLOGY | Facility: CLINIC | Age: 24
Discharge: HOME | End: 2024-08-06
Payer: COMMERCIAL

## 2024-08-06 DIAGNOSIS — Z34.00 SUPERVISION OF NORMAL FIRST PREGNANCY, ANTEPARTUM (HHS-HCC): ICD-10-CM

## 2024-08-06 PROCEDURE — 76820 UMBILICAL ARTERY ECHO: CPT | Performed by: OBSTETRICS & GYNECOLOGY

## 2024-08-06 PROCEDURE — 76820 UMBILICAL ARTERY ECHO: CPT

## 2024-08-06 PROCEDURE — 76819 FETAL BIOPHYS PROFIL W/O NST: CPT

## 2024-08-06 PROCEDURE — 76819 FETAL BIOPHYS PROFIL W/O NST: CPT | Performed by: OBSTETRICS & GYNECOLOGY

## 2024-08-13 ENCOUNTER — APPOINTMENT (OUTPATIENT)
Dept: RADIOLOGY | Facility: CLINIC | Age: 24
End: 2024-08-13
Payer: COMMERCIAL

## 2024-08-14 ENCOUNTER — APPOINTMENT (OUTPATIENT)
Dept: OBSTETRICS AND GYNECOLOGY | Facility: CLINIC | Age: 24
End: 2024-08-14
Payer: COMMERCIAL

## 2024-08-14 ENCOUNTER — HOSPITAL ENCOUNTER (OUTPATIENT)
Dept: RADIOLOGY | Facility: CLINIC | Age: 24
Discharge: HOME | End: 2024-08-14
Payer: COMMERCIAL

## 2024-08-14 DIAGNOSIS — O36.5990 IUGR (INTRAUTERINE GROWTH RESTRICTION) AFFECTING CARE OF MOTHER (HHS-HCC): ICD-10-CM

## 2024-08-14 DIAGNOSIS — Z34.00 SUPERVISION OF NORMAL FIRST PREGNANCY, ANTEPARTUM (HHS-HCC): ICD-10-CM

## 2024-08-14 DIAGNOSIS — O99.210 OBESITY AFFECTING PREGNANCY (HHS-HCC): ICD-10-CM

## 2024-08-14 PROCEDURE — 76820 UMBILICAL ARTERY ECHO: CPT

## 2024-08-14 PROCEDURE — 76816 OB US FOLLOW-UP PER FETUS: CPT | Performed by: OBSTETRICS & GYNECOLOGY

## 2024-08-14 PROCEDURE — 76820 UMBILICAL ARTERY ECHO: CPT | Performed by: OBSTETRICS & GYNECOLOGY

## 2024-08-14 PROCEDURE — 76819 FETAL BIOPHYS PROFIL W/O NST: CPT | Performed by: OBSTETRICS & GYNECOLOGY

## 2024-08-14 PROCEDURE — 76816 OB US FOLLOW-UP PER FETUS: CPT

## 2024-08-14 PROCEDURE — 76819 FETAL BIOPHYS PROFIL W/O NST: CPT

## 2024-08-15 PROBLEM — Z3A.33 33 WEEKS GESTATION OF PREGNANCY (HHS-HCC): Status: ACTIVE | Noted: 2024-08-15

## 2024-08-15 NOTE — TELEPHONE ENCOUNTER
RN tried to call patient to discuss failed 1hour glucose test and necessity for 3 hour test. Patient did not answer and voicemail is not set up at this time. Will continue to follow up with patient.     Janelle NIXONN, RN

## 2024-08-19 ENCOUNTER — APPOINTMENT (OUTPATIENT)
Dept: OBSTETRICS AND GYNECOLOGY | Facility: CLINIC | Age: 24
End: 2024-08-19
Payer: COMMERCIAL

## 2024-08-19 ENCOUNTER — TELEPHONE (OUTPATIENT)
Dept: OBSTETRICS AND GYNECOLOGY | Facility: HOSPITAL | Age: 24
End: 2024-08-19
Payer: COMMERCIAL

## 2024-08-19 NOTE — TELEPHONE ENCOUNTER
No voicemail box set up, attempted to call to remind patient to complete 3 hr-will re-attempt.  Nadia Christensen MSN, RN, CLC     ----- Message from Tosha Victoria sent at 8/2/2024  7:42 PM EDT -----  1hr resulted in 139.  Order for 3hr placed.  Please have patient complete ASAP.

## 2024-08-21 ENCOUNTER — HOSPITAL ENCOUNTER (OUTPATIENT)
Dept: RADIOLOGY | Facility: CLINIC | Age: 24
Discharge: HOME | End: 2024-08-21
Payer: COMMERCIAL

## 2024-08-21 ENCOUNTER — ROUTINE PRENATAL (OUTPATIENT)
Dept: OBSTETRICS AND GYNECOLOGY | Facility: CLINIC | Age: 24
End: 2024-08-21
Payer: COMMERCIAL

## 2024-08-21 VITALS — BODY MASS INDEX: 43.4 KG/M2 | SYSTOLIC BLOOD PRESSURE: 114 MMHG | DIASTOLIC BLOOD PRESSURE: 79 MMHG | WEIGHT: 245 LBS

## 2024-08-21 DIAGNOSIS — F32.A DEPRESSION DURING PREGNANCY IN THIRD TRIMESTER (HHS-HCC): ICD-10-CM

## 2024-08-21 DIAGNOSIS — Z87.59 HISTORY OF PRE-ECLAMPSIA: ICD-10-CM

## 2024-08-21 DIAGNOSIS — Z36.4 ULTRASOUND FOR ANTENATAL SCREENING FOR FETAL GROWTH RESTRICTION (HHS-HCC): ICD-10-CM

## 2024-08-21 DIAGNOSIS — Z3A.34 34 WEEKS GESTATION OF PREGNANCY (HHS-HCC): Primary | ICD-10-CM

## 2024-08-21 DIAGNOSIS — Z87.59 HISTORY OF POSTPARTUM HEMORRHAGE: ICD-10-CM

## 2024-08-21 DIAGNOSIS — R73.09 ELEVATED GLUCOSE TOLERANCE TEST: ICD-10-CM

## 2024-08-21 DIAGNOSIS — O99.343 DEPRESSION DURING PREGNANCY IN THIRD TRIMESTER (HHS-HCC): ICD-10-CM

## 2024-08-21 DIAGNOSIS — Z34.00 SUPERVISION OF NORMAL FIRST PREGNANCY, ANTEPARTUM (HHS-HCC): ICD-10-CM

## 2024-08-21 PROCEDURE — 76819 FETAL BIOPHYS PROFIL W/O NST: CPT | Performed by: STUDENT IN AN ORGANIZED HEALTH CARE EDUCATION/TRAINING PROGRAM

## 2024-08-21 PROCEDURE — 76819 FETAL BIOPHYS PROFIL W/O NST: CPT

## 2024-08-21 PROCEDURE — H1002 CARECOORDINATION PRENATAL: HCPCS | Performed by: ADVANCED PRACTICE MIDWIFE

## 2024-08-21 PROCEDURE — S9452 NUTRITION CLASS: HCPCS | Performed by: ADVANCED PRACTICE MIDWIFE

## 2024-08-21 PROCEDURE — 76815 OB US LIMITED FETUS(S): CPT | Performed by: STUDENT IN AN ORGANIZED HEALTH CARE EDUCATION/TRAINING PROGRAM

## 2024-08-21 PROCEDURE — 99213 OFFICE O/P EST LOW 20 MIN: CPT | Mod: SB,TH,25 | Performed by: ADVANCED PRACTICE MIDWIFE

## 2024-08-21 PROCEDURE — 76820 UMBILICAL ARTERY ECHO: CPT

## 2024-08-21 PROCEDURE — 99078 GROUP HEALTH EDUCATION: CPT | Performed by: ADVANCED PRACTICE MIDWIFE

## 2024-08-21 PROCEDURE — 76815 OB US LIMITED FETUS(S): CPT

## 2024-08-21 PROCEDURE — 76820 UMBILICAL ARTERY ECHO: CPT | Performed by: STUDENT IN AN ORGANIZED HEALTH CARE EDUCATION/TRAINING PROGRAM

## 2024-08-21 PROCEDURE — 99213 OFFICE O/P EST LOW 20 MIN: CPT | Performed by: ADVANCED PRACTICE MIDWIFE

## 2024-08-21 NOTE — PROGRESS NOTES
Subjective   Della Oconnell is a 24 y.o.  at 34w1d with a working estimated date of delivery of 10/1/2024, by Ultrasound who presents for Centering visit #1.     1:1 Visit:   She denies vaginal bleeding, abdominal pain, or leakage of fluid.     Objective   Physical Exam  Weight: 111 kg (245 lb), Pregravid BMI: 37.21  Expected Total Weight Gain: 5 kg (11 lb)-9 kg (19 lb)   BP: 114/79  Fetal Heart Rate: 148 Fundal Height (cm): 36 cm    Problem List Items Addressed This Visit          Ob-Gyn Problems    Ultrasound for  screening for fetal growth restriction ([facility])    Overview     @30w: Ac is at the 7%, and the EFW is at the 6% percentile  Weekly BPP/doppler scheduled         History of pre-eclampsia    Overview     With SF with  birth  HELLP labs WNL at new OB ; p/c collected at 13w 0.1  [ x] ASA 81mg at 12w   13w: 144/85 --> repeat 123/85, reviewed with Dr Perdomo, will continue to monitor closely            History of postpartum hemorrhage    Overview     1.3LPPH requiring IV TXA x1, IM hemabate x1, buccal cytotec x1 as well as Reyna             Other    Elevated glucose tolerance test    Overview     1hr 139  3hr not completed yet   - reviewed importance if knowing if patient has GDM or not  -reviewed risks of uncontrolled GDM and poor fetal outcomes          Depression    BMI 37.0-37.9, adult    Overview     BMI = 37.21 at NOB  Fetal surveillance BMI 35-39.9 at NOB:  Growth US at 30 (6%) and 36 wks  BPP or NST weekly 37 wks to delivery    Timing of delivery: 39 0/7 - 39 6/7 wks  *BMI >= 50 at any time in pregnancy: Deliver at Level 4            Other Visit Diagnoses       34 weeks gestation of pregnancy ([facility])    -  Primary            Centering Session #1 Plan:   -Reviewed routine lab results 1hr  139  Weekly  testing for FGR    -The following educational material was reviewed:  Group expectations/guidelines/confidentiality form  BMI; IOM recommendations for weight gain in pregnancy  based on starting BMI  Nutrition including dietary restrictions, serving sizes  Healthy lifestyle choices   -Reviewed reasons to call: heavy vaginal bleeding, loss of fluid, strong pelvic pain, any questions/concerns  -RTC for next visit in 2 weeks US next week  3hr ASAP    1:1 total time 10min  Centering Visit total time 120min    FINA Dwyer-LUISM

## 2024-08-28 ENCOUNTER — APPOINTMENT (OUTPATIENT)
Dept: OBSTETRICS AND GYNECOLOGY | Facility: CLINIC | Age: 24
End: 2024-08-28
Payer: COMMERCIAL

## 2024-09-03 ENCOUNTER — HOSPITAL ENCOUNTER (OUTPATIENT)
Dept: RADIOLOGY | Facility: CLINIC | Age: 24
Discharge: HOME | End: 2024-09-03
Payer: COMMERCIAL

## 2024-09-03 ENCOUNTER — ROUTINE PRENATAL (OUTPATIENT)
Dept: OBSTETRICS AND GYNECOLOGY | Facility: CLINIC | Age: 24
End: 2024-09-03
Payer: COMMERCIAL

## 2024-09-03 VITALS — BODY MASS INDEX: 43.88 KG/M2 | DIASTOLIC BLOOD PRESSURE: 72 MMHG | SYSTOLIC BLOOD PRESSURE: 108 MMHG | WEIGHT: 247.7 LBS

## 2024-09-03 DIAGNOSIS — Z87.59 HISTORY OF PRE-ECLAMPSIA: ICD-10-CM

## 2024-09-03 DIAGNOSIS — Z34.00 SUPERVISION OF NORMAL FIRST PREGNANCY, ANTEPARTUM (HHS-HCC): ICD-10-CM

## 2024-09-03 DIAGNOSIS — Z36.4 ULTRASOUND FOR ANTENATAL SCREENING FOR FETAL GROWTH RESTRICTION (HHS-HCC): ICD-10-CM

## 2024-09-03 DIAGNOSIS — R73.09 ELEVATED GLUCOSE TOLERANCE TEST: Primary | ICD-10-CM

## 2024-09-03 DIAGNOSIS — O36.5931 IUGR (INTRAUTERINE GROWTH RESTRICTION) AFFECTING CARE OF MOTHER, THIRD TRIMESTER, FETUS 1 (HHS-HCC): ICD-10-CM

## 2024-09-03 DIAGNOSIS — Z36.4 ULTRASOUND FOR ANTENATAL SCREENING FOR FETAL GROWTH RESTRICTION (HHS-HCC): Primary | ICD-10-CM

## 2024-09-03 DIAGNOSIS — Z3A.36 36 WEEKS GESTATION OF PREGNANCY (HHS-HCC): ICD-10-CM

## 2024-09-03 PROCEDURE — 76819 FETAL BIOPHYS PROFIL W/O NST: CPT | Performed by: OBSTETRICS & GYNECOLOGY

## 2024-09-03 PROCEDURE — 76820 UMBILICAL ARTERY ECHO: CPT | Performed by: OBSTETRICS & GYNECOLOGY

## 2024-09-03 PROCEDURE — 87081 CULTURE SCREEN ONLY: CPT

## 2024-09-03 PROCEDURE — 99214 OFFICE O/P EST MOD 30 MIN: CPT | Mod: GC,TH

## 2024-09-03 PROCEDURE — 76819 FETAL BIOPHYS PROFIL W/O NST: CPT

## 2024-09-03 PROCEDURE — 76820 UMBILICAL ARTERY ECHO: CPT

## 2024-09-03 PROCEDURE — 76816 OB US FOLLOW-UP PER FETUS: CPT | Performed by: OBSTETRICS & GYNECOLOGY

## 2024-09-03 PROCEDURE — 99214 OFFICE O/P EST MOD 30 MIN: CPT

## 2024-09-03 PROCEDURE — 76816 OB US FOLLOW-UP PER FETUS: CPT

## 2024-09-03 ASSESSMENT — ENCOUNTER SYMPTOMS
ENDOCRINE NEGATIVE: 0
CARDIOVASCULAR NEGATIVE: 0
ALLERGIC/IMMUNOLOGIC NEGATIVE: 0
PSYCHIATRIC NEGATIVE: 0
NEUROLOGICAL NEGATIVE: 0
RESPIRATORY NEGATIVE: 0
CONSTITUTIONAL NEGATIVE: 0
EYES NEGATIVE: 0
MUSCULOSKELETAL NEGATIVE: 0
GASTROINTESTINAL NEGATIVE: 0
HEMATOLOGIC/LYMPHATIC NEGATIVE: 0

## 2024-09-03 ASSESSMENT — PATIENT HEALTH QUESTIONNAIRE - PHQ9
1. LITTLE INTEREST OR PLEASURE IN DOING THINGS: NOT AT ALL
SUM OF ALL RESPONSES TO PHQ9 QUESTIONS 1 AND 2: 0
2. FEELING DOWN, DEPRESSED OR HOPELESS: NOT AT ALL

## 2024-09-03 NOTE — PROGRESS NOTES
Subjective   Patient ID [id number]   Della Oconnell is a 24 y.o.  at 36w0d with a working estimated date of delivery of 10/1/2024, by Ultrasound who presents for a routine prenatal visit. She denies vaginal bleeding, leakage of fluid, and decreased fetal movements. Having some [place] contractions. Has not been able to complete 3hr gtt given  issues. No other complaints today.       Objective   Physical Exam:   Weight: 112 kg (247 lb 11.2 oz)  Expected Total Weight Gain: 5 kg (11 lb)-9 kg (19 lb)   Pregravid BMI: 37.21  BP: 108/72  Fetal Heart Rate: us               Assessment/Plan   Problem List Items Addressed This Visit             ICD-10-CM    36 weeks gestation of pregnancy ([facility]) Z3A.36     Previously opting for formula feeding given discomfort with breat feeding in the past.  found to have tongue tie, likely contributing to discomfort. Now reconsidering breast feeding. Will continue discussion.   PPBC: considering LNG ppIUD, will continue discussion. Given info for [email or web address]   GBS collected today         Relevant Orders    Group B Streptococcus (GBS) Prenatal Screen, Culture    Elevated glucose tolerance test - Primary R73.09     Has not completed 3hr gtt due to  issues. Recommended bringing child to lab if she cannot find . Also discussed option for finger sticks, patient declining at this time.          History of pre-eclampsia Z87.59     Encouraged to start taking blood pressure a few times out of the week   PEC return precautions reviewed            Ultrasound for  screening for fetal growth restriction ([facility]) Z36.4     Continue weekly BPP/Dopplers   IOL to be scheduled for 39 wga per patient request, understands that this may need to change pending growth and dopplers. RN tasked.           RTC in 1 week  D/w and seen by Dr. Sesar Chan MD, PGY-3

## 2024-09-03 NOTE — ASSESSMENT & PLAN NOTE
Encouraged to start taking blood pressure a few times out of the week   PEC return precautions reviewed

## 2024-09-03 NOTE — ASSESSMENT & PLAN NOTE
Previously opting for formula feeding given discomfort with breat feeding in the past.  found to have tongue tie, likely contributing to discomfort. Now reconsidering breast feeding. Will continue discussion.   PPBC: considering LNG ppIUD, will continue discussion. Given info for [email or web address]   GBS collected today

## 2024-09-03 NOTE — ASSESSMENT & PLAN NOTE
Continue weekly BPP/Dopplers   IOL to be scheduled for 39 wga per patient request, understands that this may need to change pending growth and dopplers. RN tasked.

## 2024-09-03 NOTE — ASSESSMENT & PLAN NOTE
Has not completed 3hr gtt due to  issues. Recommended bringing child to lab if she cannot find . Also discussed option for finger sticks, patient declining at this time.

## 2024-09-04 ENCOUNTER — APPOINTMENT (OUTPATIENT)
Dept: RADIOLOGY | Facility: CLINIC | Age: 24
End: 2024-09-04
Payer: COMMERCIAL

## 2024-09-05 LAB — GP B STREP GENITAL QL CULT: ABNORMAL

## 2024-09-06 ENCOUNTER — TELEPHONE (OUTPATIENT)
Dept: OBSTETRICS AND GYNECOLOGY | Facility: CLINIC | Age: 24
End: 2024-09-06
Payer: COMMERCIAL

## 2024-09-06 NOTE — TELEPHONE ENCOUNTER
Pt notified and scheduled  ----- Message from Amanuel Chan sent at 9/5/2024  6:07 PM EDT -----  Regarding: MAYITO Gonzales, I was going through this patient's chart to update her OB check list and realized that she does not have a subsequent PNV scheduled. Looks like she has a BPP/dopplers US appt scheduled for 9/10. Can we also get her scheduled for a routine OB appt that day if it works for her please? Thank you!

## 2024-09-09 ENCOUNTER — APPOINTMENT (OUTPATIENT)
Dept: OBSTETRICS AND GYNECOLOGY | Facility: CLINIC | Age: 24
End: 2024-09-09
Payer: COMMERCIAL

## 2024-09-10 ENCOUNTER — APPOINTMENT (OUTPATIENT)
Dept: OBSTETRICS AND GYNECOLOGY | Facility: CLINIC | Age: 24
End: 2024-09-10
Payer: COMMERCIAL

## 2024-09-10 ENCOUNTER — APPOINTMENT (OUTPATIENT)
Dept: RADIOLOGY | Facility: CLINIC | Age: 24
End: 2024-09-10
Payer: COMMERCIAL

## 2024-09-11 ENCOUNTER — HOSPITAL ENCOUNTER (OUTPATIENT)
Dept: RADIOLOGY | Facility: CLINIC | Age: 24
Discharge: HOME | End: 2024-09-11
Payer: COMMERCIAL

## 2024-09-11 ENCOUNTER — ROUTINE PRENATAL (OUTPATIENT)
Dept: OBSTETRICS AND GYNECOLOGY | Facility: CLINIC | Age: 24
End: 2024-09-11
Payer: COMMERCIAL

## 2024-09-11 ENCOUNTER — APPOINTMENT (OUTPATIENT)
Dept: OBSTETRICS AND GYNECOLOGY | Facility: CLINIC | Age: 24
End: 2024-09-11
Payer: COMMERCIAL

## 2024-09-11 VITALS
SYSTOLIC BLOOD PRESSURE: 120 MMHG | BODY MASS INDEX: 43.9 KG/M2 | HEART RATE: 79 BPM | WEIGHT: 247.8 LBS | DIASTOLIC BLOOD PRESSURE: 77 MMHG

## 2024-09-11 DIAGNOSIS — Z3A.37 37 WEEKS GESTATION OF PREGNANCY (HHS-HCC): ICD-10-CM

## 2024-09-11 DIAGNOSIS — Z87.59 HISTORY OF POSTPARTUM HEMORRHAGE: ICD-10-CM

## 2024-09-11 DIAGNOSIS — Z87.59 HISTORY OF PRE-ECLAMPSIA: ICD-10-CM

## 2024-09-11 DIAGNOSIS — Z36.4 ULTRASOUND FOR ANTENATAL SCREENING FOR FETAL GROWTH RESTRICTION (HHS-HCC): Primary | ICD-10-CM

## 2024-09-11 DIAGNOSIS — R73.09 ELEVATED GLUCOSE TOLERANCE TEST: ICD-10-CM

## 2024-09-11 DIAGNOSIS — Z34.00 SUPERVISION OF NORMAL FIRST PREGNANCY, ANTEPARTUM (HHS-HCC): ICD-10-CM

## 2024-09-11 DIAGNOSIS — F32.A DEPRESSION DURING PREGNANCY IN THIRD TRIMESTER (HHS-HCC): ICD-10-CM

## 2024-09-11 DIAGNOSIS — O99.343 DEPRESSION DURING PREGNANCY IN THIRD TRIMESTER (HHS-HCC): ICD-10-CM

## 2024-09-11 DIAGNOSIS — A74.9 CHLAMYDIA: ICD-10-CM

## 2024-09-11 PROCEDURE — 87661 TRICHOMONAS VAGINALIS AMPLIF: CPT | Performed by: ADVANCED PRACTICE MIDWIFE

## 2024-09-11 PROCEDURE — 76819 FETAL BIOPHYS PROFIL W/O NST: CPT | Performed by: OBSTETRICS & GYNECOLOGY

## 2024-09-11 PROCEDURE — 76820 UMBILICAL ARTERY ECHO: CPT

## 2024-09-11 PROCEDURE — 76820 UMBILICAL ARTERY ECHO: CPT | Performed by: OBSTETRICS & GYNECOLOGY

## 2024-09-11 PROCEDURE — 99213 OFFICE O/P EST LOW 20 MIN: CPT | Mod: TH,25 | Performed by: ADVANCED PRACTICE MIDWIFE

## 2024-09-11 PROCEDURE — 76819 FETAL BIOPHYS PROFIL W/O NST: CPT

## 2024-09-11 PROCEDURE — 87491 CHLMYD TRACH DNA AMP PROBE: CPT | Performed by: ADVANCED PRACTICE MIDWIFE

## 2024-09-11 NOTE — PROGRESS NOTES
Subjective   Della Oconnell is a 24 y.o.  at 37w1d with a working estimated date of delivery of 10/1/2024, by Ultrasound who presents for a routine prenatal visit.     She denies vaginal bleeding, leakage of fluid, decreased fetal movements, or contractions.    Objective   Physical Exam:   Weight: 112 kg (247 lb 12.8 oz)  TW.1 kg (37 lb 12.8 oz)  BP: 120/77  Fetal Heart Rate:  (US)   Presentation: Vertex         Postpartum Depression: Medium Risk (2024)    [place]  Depression Scale     Last EPDS Total Score: 8     Last EPDS Self Harm Result: Never        Prenatal Labs  Lab Results   Component Value Date    HGB 11.6 (L) 2024    HCT 35.4 (L) 2024     2024    SYPHT Nonreactive 2024     Lab Results   Component Value Date    GLUC1P 139 (H) 2024     Group B Strep Screen   Date Value Ref Range Status   2024 (A)  Final    Isolated: Streptococcus agalactiae (Group B Streptococcus)        Imaging  The most recent ultrasound was performed on 9/3 with a study GA of 36w0 and EFW 7%, AC 7%.     Assessment/Plan   Problem List Items Addressed This Visit          Ob-Gyn Problems    Ultrasound for  screening for fetal growth restriction ([facility]) - Primary    Overview     36.0 wga on 9/3: AC is at the 7%, and the EFW is at the 7% percentile, dopplers normal   Weekly BPP/doppler scheduled  IOL to be scheduled 38.0wga - 39.0wga          History of pre-eclampsia    Overview     With SF with  birth  HELLP labs WNL at new OB ; p/c collected at 13w 0.1  [ x] ASA 81mg at 12w   13w: 144/85 --> repeat 123/85, reviewed with Dr Perdomo, will continue to monitor closely            History of postpartum hemorrhage    Overview     1.3LPPH requiring IV TXA x1, IM hemabate x1, buccal cytotec x1 as well as Reyna             Other    Elevated glucose tolerance test    Overview     1hr 139  3hr not completed yet   - reviewed importance if knowing if patient has GDM or  not  -reviewed risks of uncontrolled GDM and poor fetal outcomes          Depression    Chlamydia    Overview     +24 ; patient reports ED didn't tell/treat her   + at new OB   Treated at new OB, EPT treatment sent as well   RADHA : neg           Relevant Orders    C. Trachomatis / N. Gonorrhoeae, Amplified Detection    Trichomonas vaginalis, Amplified    BMI 37.0-37.9, adult    Overview     BMI = 37.21 at NOB  Fetal surveillance BMI 35-39.9 at NOB:  Growth US at 30 (6%) and 36 wks  BPP or NST weekly 37 wks to delivery    Timing of delivery: 39 0/7 - 39 6/7 wks  *BMI >= 50 at any time in pregnancy: Deliver at Level 4            Reviewed growth US FGR   surveillance weekly for FGR   Discussed recommendation for IOL in setting of FGR scheduled   Reviewed s/sx of labor, warning signs, fetal movement counts, and when to call provider    Follow up in 1 week for a routine prenatal visit.    Queenie Urias, APRN-CNM

## 2024-09-12 ENCOUNTER — TELEPHONE (OUTPATIENT)
Dept: OBSTETRICS AND GYNECOLOGY | Facility: CLINIC | Age: 24
End: 2024-09-12
Payer: COMMERCIAL

## 2024-09-12 LAB
C TRACH RRNA SPEC QL NAA+PROBE: POSITIVE
N GONORRHOEA DNA SPEC QL PROBE+SIG AMP: NEGATIVE
T VAGINALIS RRNA SPEC QL NAA+PROBE: NEGATIVE

## 2024-09-12 RX ORDER — AZITHROMYCIN 500 MG/1
1000 TABLET, FILM COATED ORAL ONCE
Qty: 2 TABLET | Refills: 0 | Status: SHIPPED | OUTPATIENT
Start: 2024-09-12 | End: 2024-09-12

## 2024-09-12 NOTE — TELEPHONE ENCOUNTER
----- Message from Queenie Urias sent at 9/12/2024  1:21 PM EDT -----  Hey please let her know she has chlamydia still- I am sending another dose of azithro

## 2024-09-12 NOTE — TELEPHONE ENCOUNTER
TELEPHONE CALL TO PATIENT  Identified by name and date of birth.  Patient notified of result, treatment, partner treatment  EPT offered - declined  Instructed no sex - for induction 2 weeks  Patient verbalizes understanding

## 2024-09-16 ENCOUNTER — APPOINTMENT (OUTPATIENT)
Dept: OBSTETRICS AND GYNECOLOGY | Facility: CLINIC | Age: 24
End: 2024-09-16
Payer: COMMERCIAL

## 2024-09-17 ENCOUNTER — TELEPHONE (OUTPATIENT)
Dept: OBSTETRICS AND GYNECOLOGY | Facility: CLINIC | Age: 24
End: 2024-09-17
Payer: COMMERCIAL

## 2024-09-17 NOTE — TELEPHONE ENCOUNTER
----- Message from Nurse Lorri RODRIGUEZ sent at 9/17/2024 10:11 AM EDT -----  Regarding: IOL  this patient need IOL scheduled 38-39 for FGR she is 38w0d today having BPP/ growth and visit - per QUOC HARDEN-COLE

## 2024-09-17 NOTE — TELEPHONE ENCOUNTER
Pt requesting to keep IOL scheduled at 39wks  on 9/24/24 1100. Reviewed with KASSIDY Urias CNM Pt can keep as scheduled, please attend ultrasound apt on 9/18/24. Needs RPN apt this week or if pt cannot make apt with provider B/P check with RN on 9/18/24.

## 2024-09-18 ENCOUNTER — CLINICAL SUPPORT (OUTPATIENT)
Dept: OBSTETRICS AND GYNECOLOGY | Facility: CLINIC | Age: 24
End: 2024-09-18
Payer: COMMERCIAL

## 2024-09-18 ENCOUNTER — HOSPITAL ENCOUNTER (OUTPATIENT)
Dept: RADIOLOGY | Facility: CLINIC | Age: 24
Discharge: HOME | End: 2024-09-18
Payer: COMMERCIAL

## 2024-09-18 VITALS — HEART RATE: 74 BPM | SYSTOLIC BLOOD PRESSURE: 127 MMHG | DIASTOLIC BLOOD PRESSURE: 74 MMHG

## 2024-09-18 DIAGNOSIS — Z87.59 HISTORY OF PRE-ECLAMPSIA: ICD-10-CM

## 2024-09-18 DIAGNOSIS — Z34.00 SUPERVISION OF NORMAL FIRST PREGNANCY, ANTEPARTUM (HHS-HCC): ICD-10-CM

## 2024-09-18 DIAGNOSIS — Z3A.37 37 WEEKS GESTATION OF PREGNANCY (HHS-HCC): ICD-10-CM

## 2024-09-18 DIAGNOSIS — Z36.4 ULTRASOUND FOR ANTENATAL SCREENING FOR FETAL GROWTH RESTRICTION (HHS-HCC): ICD-10-CM

## 2024-09-18 DIAGNOSIS — R73.09 ELEVATED GLUCOSE TOLERANCE TEST: ICD-10-CM

## 2024-09-18 DIAGNOSIS — Z87.59 HISTORY OF POSTPARTUM HEMORRHAGE: ICD-10-CM

## 2024-09-18 PROCEDURE — 76820 UMBILICAL ARTERY ECHO: CPT

## 2024-09-18 PROCEDURE — 76818 FETAL BIOPHYS PROFILE W/NST: CPT

## 2024-09-18 NOTE — PROGRESS NOTES
Pt here for RN BP check that was scheduled for unable to make her visit this wk for OB appt. She is scheduled for IOL. BPP done. Her BP id 127/74 and HR 74. Her infant /toddler baby very uspset and crying - pt informed she may be added on to [facility]'s schedule tomorrow as needed per provider.  Pt unsure she can make it- wants to see if someone can watch her baby.

## 2024-09-24 ENCOUNTER — ANESTHESIA (OUTPATIENT)
Dept: OBSTETRICS AND GYNECOLOGY | Facility: HOSPITAL | Age: 24
End: 2024-09-24
Payer: COMMERCIAL

## 2024-09-24 ENCOUNTER — ANESTHESIA EVENT (OUTPATIENT)
Dept: OBSTETRICS AND GYNECOLOGY | Facility: HOSPITAL | Age: 24
End: 2024-09-24
Payer: COMMERCIAL

## 2024-09-24 ENCOUNTER — APPOINTMENT (OUTPATIENT)
Dept: OBSTETRICS AND GYNECOLOGY | Facility: HOSPITAL | Age: 24
End: 2024-09-24
Payer: COMMERCIAL

## 2024-09-24 ENCOUNTER — HOSPITAL ENCOUNTER (INPATIENT)
Facility: HOSPITAL | Age: 24
LOS: 3 days | Discharge: HOME | End: 2024-09-27
Attending: STUDENT IN AN ORGANIZED HEALTH CARE EDUCATION/TRAINING PROGRAM | Admitting: ADVANCED PRACTICE MIDWIFE
Payer: COMMERCIAL

## 2024-09-24 DIAGNOSIS — Z36.4 ULTRASOUND FOR ANTENATAL SCREENING FOR FETAL GROWTH RESTRICTION (HHS-HCC): ICD-10-CM

## 2024-09-24 DIAGNOSIS — O99.340 DEPRESSION DURING PREGNANCY, ANTEPARTUM (HHS-HCC): ICD-10-CM

## 2024-09-24 DIAGNOSIS — F32.A DEPRESSION DURING PREGNANCY, ANTEPARTUM (HHS-HCC): ICD-10-CM

## 2024-09-24 PROBLEM — Z34.90 ENCOUNTER FOR PLANNED INDUCTION OF LABOR: Status: ACTIVE | Noted: 2024-09-24

## 2024-09-24 PROBLEM — O36.5930 POOR FETAL GROWTH AFFECTING MANAGEMENT OF MOTHER IN THIRD TRIMESTER (HHS-HCC): Status: ACTIVE | Noted: 2024-07-23

## 2024-09-24 LAB
ABO GROUP (TYPE) IN BLOOD: NORMAL
ANTIBODY SCREEN: NORMAL
ERYTHROCYTE [DISTWIDTH] IN BLOOD BY AUTOMATED COUNT: 15.5 % (ref 11.5–14.5)
GLUCOSE BLD MANUAL STRIP-MCNC: 108 MG/DL (ref 74–99)
HCT VFR BLD AUTO: 36 % (ref 36–46)
HGB BLD-MCNC: 11.9 G/DL (ref 12–16)
MCH RBC QN AUTO: 26.8 PG (ref 26–34)
MCHC RBC AUTO-ENTMCNC: 33.1 G/DL (ref 32–36)
MCV RBC AUTO: 81 FL (ref 80–100)
NRBC BLD-RTO: 0 /100 WBCS (ref 0–0)
PLATELET # BLD AUTO: 288 X10*3/UL (ref 150–450)
RBC # BLD AUTO: 4.44 X10*6/UL (ref 4–5.2)
RH FACTOR (ANTIGEN D): NORMAL
TREPONEMA PALLIDUM IGG+IGM AB [PRESENCE] IN SERUM OR PLASMA BY IMMUNOASSAY: NONREACTIVE
WBC # BLD AUTO: 9.1 X10*3/UL (ref 4.4–11.3)

## 2024-09-24 PROCEDURE — 82947 ASSAY GLUCOSE BLOOD QUANT: CPT

## 2024-09-24 PROCEDURE — 36415 COLL VENOUS BLD VENIPUNCTURE: CPT | Performed by: ADVANCED PRACTICE MIDWIFE

## 2024-09-24 PROCEDURE — 3E0P7VZ INTRODUCTION OF HORMONE INTO FEMALE REPRODUCTIVE, VIA NATURAL OR ARTIFICIAL OPENING: ICD-10-PCS | Performed by: OBSTETRICS & GYNECOLOGY

## 2024-09-24 PROCEDURE — 51701 INSERT BLADDER CATHETER: CPT

## 2024-09-24 PROCEDURE — 7210000002 HC LABOR PER HOUR

## 2024-09-24 PROCEDURE — 86923 COMPATIBILITY TEST ELECTRIC: CPT

## 2024-09-24 PROCEDURE — 2500000001 HC RX 250 WO HCPCS SELF ADMINISTERED DRUGS (ALT 637 FOR MEDICARE OP): Performed by: ADVANCED PRACTICE MIDWIFE

## 2024-09-24 PROCEDURE — 86780 TREPONEMA PALLIDUM: CPT | Performed by: ADVANCED PRACTICE MIDWIFE

## 2024-09-24 PROCEDURE — 2500000004 HC RX 250 GENERAL PHARMACY W/ HCPCS (ALT 636 FOR OP/ED): Performed by: ADVANCED PRACTICE MIDWIFE

## 2024-09-24 PROCEDURE — 2720000007 HC OR 272 NO HCPCS

## 2024-09-24 PROCEDURE — 10907ZC DRAINAGE OF AMNIOTIC FLUID, THERAPEUTIC FROM PRODUCTS OF CONCEPTION, VIA NATURAL OR ARTIFICIAL OPENING: ICD-10-PCS | Performed by: OBSTETRICS & GYNECOLOGY

## 2024-09-24 PROCEDURE — 59050 FETAL MONITOR W/REPORT: CPT

## 2024-09-24 PROCEDURE — 2500000005 HC RX 250 GENERAL PHARMACY W/O HCPCS

## 2024-09-24 PROCEDURE — 3700000014 EPIDURAL BLOCK: Mod: GC

## 2024-09-24 PROCEDURE — 86901 BLOOD TYPING SEROLOGIC RH(D): CPT | Performed by: ADVANCED PRACTICE MIDWIFE

## 2024-09-24 PROCEDURE — 1120000001 HC OB PRIVATE ROOM DAILY

## 2024-09-24 PROCEDURE — 85027 COMPLETE CBC AUTOMATED: CPT | Performed by: ADVANCED PRACTICE MIDWIFE

## 2024-09-24 RX ORDER — FENTANYL/BUPIVACAINE/NS/PF 2MCG/ML-.1
PLASTIC BAG, INJECTION (ML) INJECTION AS NEEDED
Status: DISCONTINUED | OUTPATIENT
Start: 2024-09-24 | End: 2024-09-25

## 2024-09-24 RX ORDER — LABETALOL HYDROCHLORIDE 5 MG/ML
20 INJECTION, SOLUTION INTRAVENOUS ONCE AS NEEDED
Status: DISCONTINUED | OUTPATIENT
Start: 2024-09-24 | End: 2024-09-25 | Stop reason: HOSPADM

## 2024-09-24 RX ORDER — METHYLERGONOVINE MALEATE 0.2 MG/ML
0.2 INJECTION INTRAVENOUS ONCE AS NEEDED
Status: DISCONTINUED | OUTPATIENT
Start: 2024-09-24 | End: 2024-09-25 | Stop reason: HOSPADM

## 2024-09-24 RX ORDER — LOPERAMIDE HYDROCHLORIDE 2 MG/1
4 CAPSULE ORAL EVERY 2 HOUR PRN
Status: DISCONTINUED | OUTPATIENT
Start: 2024-09-24 | End: 2024-09-25 | Stop reason: HOSPADM

## 2024-09-24 RX ORDER — SODIUM CHLORIDE, SODIUM LACTATE, POTASSIUM CHLORIDE, CALCIUM CHLORIDE 600; 310; 30; 20 MG/100ML; MG/100ML; MG/100ML; MG/100ML
125 INJECTION, SOLUTION INTRAVENOUS CONTINUOUS
Status: DISCONTINUED | OUTPATIENT
Start: 2024-09-24 | End: 2024-09-25

## 2024-09-24 RX ORDER — TERBUTALINE SULFATE 1 MG/ML
0.25 INJECTION SUBCUTANEOUS ONCE AS NEEDED
Status: DISCONTINUED | OUTPATIENT
Start: 2024-09-24 | End: 2024-09-25 | Stop reason: HOSPADM

## 2024-09-24 RX ORDER — OXYTOCIN/0.9 % SODIUM CHLORIDE 30/500 ML
2-30 PLASTIC BAG, INJECTION (ML) INTRAVENOUS CONTINUOUS
Status: DISCONTINUED | OUTPATIENT
Start: 2024-09-24 | End: 2024-09-25

## 2024-09-24 RX ORDER — CARBOPROST TROMETHAMINE 250 UG/ML
250 INJECTION, SOLUTION INTRAMUSCULAR ONCE AS NEEDED
Status: DISCONTINUED | OUTPATIENT
Start: 2024-09-24 | End: 2024-09-25 | Stop reason: HOSPADM

## 2024-09-24 RX ORDER — METOCLOPRAMIDE HYDROCHLORIDE 5 MG/ML
10 INJECTION INTRAMUSCULAR; INTRAVENOUS EVERY 6 HOURS PRN
Status: DISCONTINUED | OUTPATIENT
Start: 2024-09-24 | End: 2024-09-25 | Stop reason: HOSPADM

## 2024-09-24 RX ORDER — OXYTOCIN 10 [USP'U]/ML
10 INJECTION, SOLUTION INTRAMUSCULAR; INTRAVENOUS ONCE AS NEEDED
Status: DISCONTINUED | OUTPATIENT
Start: 2024-09-24 | End: 2024-09-25 | Stop reason: HOSPADM

## 2024-09-24 RX ORDER — NIFEDIPINE 10 MG/1
10 CAPSULE ORAL ONCE AS NEEDED
Status: DISCONTINUED | OUTPATIENT
Start: 2024-09-24 | End: 2024-09-25 | Stop reason: HOSPADM

## 2024-09-24 RX ORDER — ONDANSETRON 4 MG/1
4 TABLET, FILM COATED ORAL EVERY 6 HOURS PRN
Status: DISCONTINUED | OUTPATIENT
Start: 2024-09-24 | End: 2024-09-25 | Stop reason: HOSPADM

## 2024-09-24 RX ORDER — ONDANSETRON HYDROCHLORIDE 2 MG/ML
4 INJECTION, SOLUTION INTRAVENOUS EVERY 6 HOURS PRN
Status: DISCONTINUED | OUTPATIENT
Start: 2024-09-24 | End: 2024-09-25 | Stop reason: HOSPADM

## 2024-09-24 RX ORDER — FENTANYL/BUPIVACAINE/NS/PF 2MCG/ML-.1
PLASTIC BAG, INJECTION (ML) INJECTION CONTINUOUS PRN
Status: DISCONTINUED | OUTPATIENT
Start: 2024-09-24 | End: 2024-09-25

## 2024-09-24 RX ORDER — OXYTOCIN/0.9 % SODIUM CHLORIDE 30/500 ML
60 PLASTIC BAG, INJECTION (ML) INTRAVENOUS ONCE AS NEEDED
Status: COMPLETED | OUTPATIENT
Start: 2024-09-24 | End: 2024-09-25

## 2024-09-24 RX ORDER — TRANEXAMIC ACID 100 MG/ML
1000 INJECTION, SOLUTION INTRAVENOUS ONCE AS NEEDED
Status: DISCONTINUED | OUTPATIENT
Start: 2024-09-24 | End: 2024-09-25 | Stop reason: HOSPADM

## 2024-09-24 RX ORDER — METOCLOPRAMIDE 10 MG/1
10 TABLET ORAL EVERY 6 HOURS PRN
Status: DISCONTINUED | OUTPATIENT
Start: 2024-09-24 | End: 2024-09-25 | Stop reason: HOSPADM

## 2024-09-24 RX ORDER — MISOPROSTOL 200 UG/1
800 TABLET ORAL ONCE AS NEEDED
Status: DISCONTINUED | OUTPATIENT
Start: 2024-09-24 | End: 2024-09-25 | Stop reason: HOSPADM

## 2024-09-24 RX ORDER — ASPIRIN 81 MG/1
81 TABLET ORAL DAILY
COMMUNITY

## 2024-09-24 RX ORDER — LIDOCAINE HYDROCHLORIDE 10 MG/ML
30 INJECTION, SOLUTION INFILTRATION; PERINEURAL ONCE AS NEEDED
Status: DISCONTINUED | OUTPATIENT
Start: 2024-09-24 | End: 2024-09-25 | Stop reason: HOSPADM

## 2024-09-24 RX ORDER — HYDRALAZINE HYDROCHLORIDE 20 MG/ML
5 INJECTION INTRAMUSCULAR; INTRAVENOUS ONCE AS NEEDED
Status: DISCONTINUED | OUTPATIENT
Start: 2024-09-24 | End: 2024-09-25 | Stop reason: HOSPADM

## 2024-09-24 RX ORDER — PENICILLIN G 3000000 [IU]/50ML
3 INJECTION, SOLUTION INTRAVENOUS EVERY 4 HOURS
Status: DISCONTINUED | OUTPATIENT
Start: 2024-09-24 | End: 2024-09-25 | Stop reason: HOSPADM

## 2024-09-24 RX ADMIN — MISOPROSTOL 25 MCG: 100 TABLET ORAL at 12:39

## 2024-09-24 RX ADMIN — SODIUM CHLORIDE, POTASSIUM CHLORIDE, SODIUM LACTATE AND CALCIUM CHLORIDE 125 ML/HR: 600; 310; 30; 20 INJECTION, SOLUTION INTRAVENOUS at 12:18

## 2024-09-24 RX ADMIN — SODIUM CHLORIDE, POTASSIUM CHLORIDE, SODIUM LACTATE AND CALCIUM CHLORIDE 500 ML: 600; 310; 30; 20 INJECTION, SOLUTION INTRAVENOUS at 21:35

## 2024-09-24 RX ADMIN — PENICILLIN G 3 MILLION UNITS: 3000000 INJECTION, SOLUTION INTRAVENOUS at 20:45

## 2024-09-24 RX ADMIN — PENICILLIN G POTASSIUM 5 MILLION UNITS: 5000000 INJECTION, POWDER, FOR SOLUTION INTRAMUSCULAR; INTRAVENOUS at 12:23

## 2024-09-24 RX ADMIN — PENICILLIN G 3 MILLION UNITS: 3000000 INJECTION, SOLUTION INTRAVENOUS at 16:29

## 2024-09-24 RX ADMIN — SODIUM CHLORIDE, POTASSIUM CHLORIDE, SODIUM LACTATE AND CALCIUM CHLORIDE 125 ML/HR: 600; 310; 30; 20 INJECTION, SOLUTION INTRAVENOUS at 19:30

## 2024-09-24 RX ADMIN — Medication 2 MILLI-UNITS/MIN: at 19:26

## 2024-09-24 SDOH — SOCIAL STABILITY: SOCIAL INSECURITY: VERBAL ABUSE: DENIES

## 2024-09-24 SDOH — SOCIAL STABILITY: SOCIAL INSECURITY: PHYSICAL ABUSE: DENIES

## 2024-09-24 SDOH — SOCIAL STABILITY: SOCIAL INSECURITY: ARE THERE ANY APPARENT SIGNS OF INJURIES/BEHAVIORS THAT COULD BE RELATED TO ABUSE/NEGLECT?: NO

## 2024-09-24 SDOH — SOCIAL STABILITY: SOCIAL INSECURITY: ARE YOU OR HAVE YOU BEEN THREATENED OR ABUSED PHYSICALLY, EMOTIONALLY, OR SEXUALLY BY ANYONE?: NO

## 2024-09-24 SDOH — SOCIAL STABILITY: SOCIAL INSECURITY: HAVE YOU HAD ANY THOUGHTS OF HARMING ANYONE ELSE?: NO

## 2024-09-24 SDOH — SOCIAL STABILITY: SOCIAL INSECURITY: HAVE YOU HAD THOUGHTS OF HARMING ANYONE ELSE?: NO

## 2024-09-24 SDOH — SOCIAL STABILITY: SOCIAL INSECURITY: DOES ANYONE TRY TO KEEP YOU FROM HAVING/CONTACTING OTHER FRIENDS OR DOING THINGS OUTSIDE YOUR HOME?: NO

## 2024-09-24 SDOH — HEALTH STABILITY: MENTAL HEALTH: WERE YOU ABLE TO COMPLETE ALL THE BEHAVIORAL HEALTH SCREENINGS?: YES

## 2024-09-24 SDOH — HEALTH STABILITY: MENTAL HEALTH: NON-SPECIFIC ACTIVE SUICIDAL THOUGHTS (PAST 1 MONTH): NO

## 2024-09-24 SDOH — SOCIAL STABILITY: SOCIAL INSECURITY: ABUSE SCREEN: ADULT

## 2024-09-24 SDOH — SOCIAL STABILITY: SOCIAL INSECURITY: HAS ANYONE EVER THREATENED TO HURT YOUR FAMILY OR YOUR PETS?: NO

## 2024-09-24 SDOH — HEALTH STABILITY: MENTAL HEALTH: SUICIDAL BEHAVIOR (LIFETIME): NO

## 2024-09-24 SDOH — HEALTH STABILITY: MENTAL HEALTH: WISH TO BE DEAD (PAST 1 MONTH): NO

## 2024-09-24 SDOH — SOCIAL STABILITY: SOCIAL INSECURITY: DO YOU FEEL ANYONE HAS EXPLOITED OR TAKEN ADVANTAGE OF YOU FINANCIALLY OR OF YOUR PERSONAL PROPERTY?: NO

## 2024-09-24 SDOH — ECONOMIC STABILITY: HOUSING INSECURITY: DO YOU FEEL UNSAFE GOING BACK TO THE PLACE WHERE YOU ARE LIVING?: NO

## 2024-09-24 ASSESSMENT — LIFESTYLE VARIABLES
HOW OFTEN DO YOU HAVE 6 OR MORE DRINKS ON ONE OCCASION: NEVER
HOW MANY STANDARD DRINKS CONTAINING ALCOHOL DO YOU HAVE ON A TYPICAL DAY: PATIENT DOES NOT DRINK
AUDIT-C TOTAL SCORE: 0
HOW OFTEN DO YOU HAVE A DRINK CONTAINING ALCOHOL: NEVER
AUDIT-C TOTAL SCORE: 0
SKIP TO QUESTIONS 9-10: 1

## 2024-09-24 ASSESSMENT — PAIN SCALES - GENERAL
PAINLEVEL_OUTOF10: 0 - NO PAIN
PAINLEVEL_OUTOF10: 5 - MODERATE PAIN
PAINLEVEL_OUTOF10: 5 - MODERATE PAIN
PAINLEVEL_OUTOF10: 4
PAINLEVEL_OUTOF10: 4
PAINLEVEL_OUTOF10: 0 - NO PAIN

## 2024-09-24 ASSESSMENT — ACTIVITIES OF DAILY LIVING (ADL)
HEARING - LEFT EAR: FUNCTIONAL
DRESSING YOURSELF: INDEPENDENT
TOILETING: INDEPENDENT
LACK_OF_TRANSPORTATION: NO
ADEQUATE_TO_COMPLETE_ADL: YES
FEEDING YOURSELF: INDEPENDENT
HEARING - RIGHT EAR: FUNCTIONAL
BATHING: INDEPENDENT
PATIENT'S MEMORY ADEQUATE TO SAFELY COMPLETE DAILY ACTIVITIES?: YES
WALKS IN HOME: INDEPENDENT
JUDGMENT_ADEQUATE_SAFELY_COMPLETE_DAILY_ACTIVITIES: YES
GROOMING: INDEPENDENT

## 2024-09-24 ASSESSMENT — PATIENT HEALTH QUESTIONNAIRE - PHQ9
1. LITTLE INTEREST OR PLEASURE IN DOING THINGS: NOT AT ALL
2. FEELING DOWN, DEPRESSED OR HOPELESS: NOT AT ALL
SUM OF ALL RESPONSES TO PHQ9 QUESTIONS 1 & 2: 0

## 2024-09-24 NOTE — H&P
"Note Type:  OB Admission H&P    ASSESSMENT & PLAN: Della Oconnell is a 24 y.o.  at 39w0d who presents for  Induction of Labor    Plan:    -Admit to L&D, consented,  cephalic based on: ultrasound  -Labs drawn:  T&S, CBC, and Syphilis  -Epidural at patient request  -Recheck as clinically indicated by maternal or fetal status  -Plan to initiate induction with CRB and cytotec    Fetal Status  -FHT cat 1  -Presentation cephalic based on bedside ultrasound  -EFW 2724g by 36w0d US  -1hr 139, no 3hr GTT or fingersticks completed  -GBS positive    Fetal Growth Restriction  -dx at 36w0d with EFW in 7%ile, AC 7%ile, normal dopplers    Hx PPH  -with    -1.3L PPH requiring IV TXA x1, IM hemabate x1, buccal cytotec x1 as well as Reyna   -Received 1u pRBC  -plan to T&C for 1 unit PRBC, will give 30 units PP pitocin    Depression/Anxiety  -hx inpatient admission 2022 for passive SI, severe depression  -was on lexapro and mirtazapine at that time  -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 Ramirez, will plan q4hr fingersticks in labor and treat with sliding scale prn    Class 3 Obesity  -SCDs in labor  -for DVT ppx with lovenox postpartum    STI in pregnancy  -positive chlamydia in 1T and 3T, treated  -pt declines EPT    -Postpartum Contraception Plan:  defers to 6wk PPV    Pregnancy Problems (from 24 to present)       Problem Noted Resolved    Encounter for planned induction of labor ([facility]) 2024 by CHANTALE Kevin, FINA-CNP No    Priority:  Medium      Elevated glucose tolerance test 2024 by CHANTALE Dwyer No    Priority:  Medium      Overview Signed 2024 12:10 PM by CHANTALE Dwyer     1hr 139  3hr not completed yet   - reviewed " importance if knowing if patient has GDM or not  -reviewed risks of uncontrolled GDM and poor fetal outcomes          37 weeks gestation of pregnancy ([facility]) 8/15/2024 by CHANTALE Cruz No    Priority:  Medium      Overview Addendum 9/6/2024  2:47 PM by Amanuel Chan MD       [x] CNM  [] Physician  [x] Dated by: 13w US  [x] Initial BMI: 37  [x] Prenatal Labs: WNL except for +chlamydia  [x] Rh status: B+  [x] Genetic Screening:  rr cf DNA  [x] Baby ASA: rx sent at 13w     [x] Anatomy US: WNL at 23w except the following structures were not visualized: face, heart, sag sex, fingers, angles --> completed in 2 weeks  [x] Fetal Sex: male, yes to circ  [] Patient added to BirthTracks  [x] 1hr GCT at 24-28wks: 139  [] 3 hr GTT (if indicated):  [x] Fetal Surveillance (if indicated): undergoing weekly BPP and q 3 week growth for FGR dx @ 30 weeks    [x] Tdap (27-36wks): 8/1/24  [x] Flu Shot: declines 4/1/24  [] COVID vaccine:      [] declines, plans to formula feed  [x] Pain management during labor: epidural  [] Postpartum Birth control method:   [x] Labor Support: FOB,   [x] GBS at 36 wks: GBS positive         Poor fetal growth affecting management of mother in third trimester ([facility]) 7/23/2024 by CHANTALE Donahue No    Priority:  Medium      Overview Addendum 9/3/2024 12:55 PM by Amanuel Chan MD     36.0 wga on 9/3: AC is at the 7%, and the EFW is at the 7% percentile, dopplers normal   Weekly BPP/doppler scheduled  IOL to be scheduled 38.0wga - 39.0wga          History of pre-eclampsia 2/26/2024 by CHANTALE Donahue No    Priority:  Medium      Overview Addendum 4/2/2024  8:00 AM by CHANTALE Donahue     With SF with 2023 birth  HELLP labs WNL at new OB ; p/c collected at 13w 0.1  [ x] ASA 81mg at 12w   13w: 144/85 --> repeat 123/85, reviewed with Dr Perdomo, will continue to monitor closely            History of postpartum hemorrhage 2/26/2024 by Anna  CHANTALE Mariscal No    Priority:  Medium      Overview Signed 2024  3:01 PM by CHANTALE Donahue     1.3LPPH requiring IV TXA x1, IM hemabate x1, buccal cytotec x1 as well as Reyna          BMI 45.0-49.9, adult ([facility]) 2024 by CHANTALE Donahue No    Priority:  Medium      Overview Addendum 2024 10:41 AM by CHANTALE Donahue     BMI = 37.21 at NOB  Fetal surveillance BMI 35-39.9 at NOB:  Growth US at 30 (6%) and 36 wks  BPP or NST weekly 37 wks to delivery    Timing of delivery: 39 0/7 - 39 6/7 wks  *BMI >= 50 at any time in pregnancy: Deliver at Level 4                 Subjective   Della Oconnell is a 24 y.o.  at 39w0d who presents for IOL in s/o FGR <10%ile. She reports good fetal movement. Denies vaginal bleeding. Denies contractions. Denies leaking of fluid.      Prenatal Provider CNM's    OB History    Para Term  AB Living   3 1 1 0 1 1   SAB IAB Ectopic Multiple Live Births   1 0 0 0 1      # Outcome Date GA Lbr Michele/2nd Weight Sex Type Anes PTL Lv   3 Current            2 Term 24 38w5d  2.495 kg F Vag-Spont EPI  TORRES      Birth Comments: 1.3LPPH requiring IV TXA x1, IM hemabate x1, buccal cytotec x1 as well as Reyna      Complications: Hemorrhage, Preeclampsia ([facility])   1 SAB                History reviewed. No pertinent surgical history.    Social History     Tobacco Use    Smoking status: Former     Types: Cigars    Smokeless tobacco: Never   Substance Use Topics    Alcohol use: Not Currently       No Known Allergies    Medications Prior to Admission   Medication Sig Dispense Refill Last Dose    aspirin 81 mg EC tablet Take 1 tablet (81 mg) by mouth once daily.   2024    [id number]-dha-epa-fish oil (Prenatal Gummy) 400 mcg-35 mg -25 mg-5 mg tablet,chewable Chew 1 tablet once daily. 90 tablet 3 2024    docusate sodium (Colace) 100 mg capsule Take 1 capsule (100 mg) by mouth as needed at bedtime for  constipation. (Patient not taking: Reported on 9/24/2024) 90 capsule 1 More than a month     Objective     Last Vitals  Temp Pulse Resp BP MAP O2 Sat   36 °C (96.8 °F) 78 17 112/79 92 100 %     Blood Pressures         9/24/2024  1125 9/24/2024  1251 9/24/2024  1346       BP: 136/81 136/91 112/79            Physical Exam  General: NAD, mood appropriate  Cardiopulmonary: warm and well perfused, breathing comfortably on room air  Abdomen: Gravid, non-tender  Extremities: full range of motion  Speculum Exam: no pooling of fluid seen, friable with CRB placement  Cervix: Fingertip /20 /-3      Fetal Monitoring  Baseline: 135 bpm, Variability: moderate,  Accelerations: present and Decelerations: none    Uterine Activity: Irregular contractions    Interpretation: Category 1    Bedside ultrasound: Yes, cephalic    Labs in chart were reviewed.   Results from last 7 days   Lab Units 09/24/24  1211   WBC AUTO x10*3/uL 9.1   HEMOGLOBIN g/dL 11.9*   HEMATOCRIT % 36.0   PLATELETS AUTO x10*3/uL 288      Prenatal labs reviewed, remarkable for  .  -chlamydia in 3T, treated  -GBS pos  -elevated 1hr (139) with no 3hr GTT or fingersticks  -depression, anxiety

## 2024-09-24 NOTE — ANESTHESIA PREPROCEDURE EVALUATION
Patient: Della Oconnell    Evaluation Method: In-person visit    Procedure Information    Date: 09/24/24  Procedure: Labor Consult         Relevant Problems   Neuro   (+) Anxiety   (+) Depression      ID   (+) Chlamydia      GYN   (+) 37 weeks gestation of pregnancy ([facility])       Clinical information reviewed:    Allergies  Meds               NPO Detail:  No data recorded     OB/Gyn Evaluation    Present Pregnancy    Patient is pregnant now.  (+) , fetal growth restriction   Obstetric History    (+) hypertensive disorder of pregnancy - preeclampsia, obstetric hemorrhage              Physical Exam    Airway  Mallampati: I  TM distance: >3 FB     Cardiovascular - normal exam     Dental - normal exam     Pulmonary - normal exam     Abdominal     Comments: Pregnant           Anesthesia Plan    History of general anesthesia?: no  History of complications of general anesthesia?: no    ASA 1     epidural     Anesthetic plan and risks discussed with patient.  Use of blood products discussed with patient who consented to blood products.    Plan discussed with resident.

## 2024-09-24 NOTE — PROGRESS NOTES
CNM Brief Progress Note    Per RN, pt declines SVE at this time  Pt prefers to start pitocin, order placed  FHT cat 1  Pt also declining fingersticks, will discuss rationale  Continue GBS prophylaxis  Recheck as clinically indicted by maternal or fetal status     Anh Pena, APRN-CNM, APRN-CNP

## 2024-09-24 NOTE — PROGRESS NOTES
Assessment    24 y.o.  at 39w0d admitted for IOL in s/o FGR <10%ile  FHT Category 1  Latent labor    Plan    -CRB placed at 1235, out at 1420  -s/p cyto x1  -pt declines cervical exam at this time, trying to rest  -discussed continuing IOL with AROM or pitocin, pt to consider    Anh Pena, APRN-CNM, APRN-CNP    Subjective:  Della Oconnell is a 24 y.o.  at 39w0d admitted for IOL FGR <10%ile    Objective:  Fetal Monitoring      Baseline FHR: 130 per minute  Variability: moderate  Accelerations: yes  Decelerations: none  TOCO:  2 in 10 min    Cervical Exam:  deferred per pt preference    Membrane status: intact    Vitals:    24 1251 24 1346 24 1532 24 1533   BP: (!) 136/91 112/79  125/65   Pulse: 84 78 70 76   Resp:  19  19   Temp:  36 °C (96.8 °F) 36.6 °C (97.9 °F)    TempSrc:  Temporal Temporal    SpO2:   99%    Weight:       Height:

## 2024-09-25 ENCOUNTER — APPOINTMENT (OUTPATIENT)
Dept: OBSTETRICS AND GYNECOLOGY | Facility: CLINIC | Age: 24
End: 2024-09-25
Payer: COMMERCIAL

## 2024-09-25 PROCEDURE — 59409 OBSTETRICAL CARE: CPT

## 2024-09-25 PROCEDURE — 88307 TISSUE EXAM BY PATHOLOGIST: CPT | Mod: TC,SUR

## 2024-09-25 PROCEDURE — 1100000001 HC PRIVATE ROOM DAILY

## 2024-09-25 PROCEDURE — 59409 OBSTETRICAL CARE: CPT | Performed by: OBSTETRICS & GYNECOLOGY

## 2024-09-25 PROCEDURE — 2500000001 HC RX 250 WO HCPCS SELF ADMINISTERED DRUGS (ALT 637 FOR MEDICARE OP)

## 2024-09-25 PROCEDURE — 7210000002 HC LABOR PER HOUR

## 2024-09-25 PROCEDURE — 88307 TISSUE EXAM BY PATHOLOGIST: CPT | Performed by: PATHOLOGY

## 2024-09-25 PROCEDURE — 7100000016 HC LABOR RECOVERY PER HOUR

## 2024-09-25 PROCEDURE — 2500000004 HC RX 250 GENERAL PHARMACY W/ HCPCS (ALT 636 FOR OP/ED): Performed by: ADVANCED PRACTICE MIDWIFE

## 2024-09-25 RX ORDER — IBUPROFEN 600 MG/1
600 TABLET ORAL EVERY 6 HOURS
Status: DISCONTINUED | OUTPATIENT
Start: 2024-09-25 | End: 2024-09-27 | Stop reason: HOSPADM

## 2024-09-25 RX ORDER — OXYTOCIN/0.9 % SODIUM CHLORIDE 30/500 ML
60 PLASTIC BAG, INJECTION (ML) INTRAVENOUS ONCE AS NEEDED
Status: DISCONTINUED | OUTPATIENT
Start: 2024-09-25 | End: 2024-09-27 | Stop reason: HOSPADM

## 2024-09-25 RX ORDER — HYDRALAZINE HYDROCHLORIDE 20 MG/ML
5 INJECTION INTRAMUSCULAR; INTRAVENOUS ONCE AS NEEDED
Status: DISCONTINUED | OUTPATIENT
Start: 2024-09-25 | End: 2024-09-27 | Stop reason: HOSPADM

## 2024-09-25 RX ORDER — METHYLERGONOVINE MALEATE 0.2 MG/ML
0.2 INJECTION INTRAVENOUS ONCE AS NEEDED
Status: DISCONTINUED | OUTPATIENT
Start: 2024-09-25 | End: 2024-09-27 | Stop reason: HOSPADM

## 2024-09-25 RX ORDER — DIPHENHYDRAMINE HYDROCHLORIDE 50 MG/ML
25 INJECTION INTRAMUSCULAR; INTRAVENOUS EVERY 6 HOURS PRN
Status: DISCONTINUED | OUTPATIENT
Start: 2024-09-25 | End: 2024-09-27 | Stop reason: HOSPADM

## 2024-09-25 RX ORDER — NIFEDIPINE 10 MG/1
10 CAPSULE ORAL ONCE AS NEEDED
Status: DISCONTINUED | OUTPATIENT
Start: 2024-09-25 | End: 2024-09-27 | Stop reason: HOSPADM

## 2024-09-25 RX ORDER — ONDANSETRON HYDROCHLORIDE 2 MG/ML
4 INJECTION, SOLUTION INTRAVENOUS EVERY 6 HOURS PRN
Status: DISCONTINUED | OUTPATIENT
Start: 2024-09-25 | End: 2024-09-27 | Stop reason: HOSPADM

## 2024-09-25 RX ORDER — OXYTOCIN 10 [USP'U]/ML
10 INJECTION, SOLUTION INTRAMUSCULAR; INTRAVENOUS ONCE AS NEEDED
Status: DISCONTINUED | OUTPATIENT
Start: 2024-09-25 | End: 2024-09-27 | Stop reason: HOSPADM

## 2024-09-25 RX ORDER — DIPHENHYDRAMINE HCL 25 MG
25 CAPSULE ORAL EVERY 6 HOURS PRN
Status: DISCONTINUED | OUTPATIENT
Start: 2024-09-25 | End: 2024-09-27 | Stop reason: HOSPADM

## 2024-09-25 RX ORDER — POLYETHYLENE GLYCOL 3350 17 G/17G
17 POWDER, FOR SOLUTION ORAL 2 TIMES DAILY PRN
Status: DISCONTINUED | OUTPATIENT
Start: 2024-09-25 | End: 2024-09-27 | Stop reason: HOSPADM

## 2024-09-25 RX ORDER — LABETALOL HYDROCHLORIDE 5 MG/ML
20 INJECTION, SOLUTION INTRAVENOUS ONCE AS NEEDED
Status: DISCONTINUED | OUTPATIENT
Start: 2024-09-25 | End: 2024-09-27 | Stop reason: HOSPADM

## 2024-09-25 RX ORDER — ENOXAPARIN SODIUM 100 MG/ML
60 INJECTION SUBCUTANEOUS EVERY 24 HOURS
Status: DISCONTINUED | OUTPATIENT
Start: 2024-09-26 | End: 2024-09-27 | Stop reason: HOSPADM

## 2024-09-25 RX ORDER — MISOPROSTOL 200 UG/1
800 TABLET ORAL ONCE AS NEEDED
Status: DISCONTINUED | OUTPATIENT
Start: 2024-09-25 | End: 2024-09-27 | Stop reason: HOSPADM

## 2024-09-25 RX ORDER — ADHESIVE BANDAGE
10 BANDAGE TOPICAL
Status: DISCONTINUED | OUTPATIENT
Start: 2024-09-25 | End: 2024-09-27 | Stop reason: HOSPADM

## 2024-09-25 RX ORDER — SIMETHICONE 80 MG
80 TABLET,CHEWABLE ORAL 4 TIMES DAILY PRN
Status: DISCONTINUED | OUTPATIENT
Start: 2024-09-25 | End: 2024-09-27 | Stop reason: HOSPADM

## 2024-09-25 RX ORDER — ONDANSETRON 4 MG/1
4 TABLET, FILM COATED ORAL EVERY 6 HOURS PRN
Status: DISCONTINUED | OUTPATIENT
Start: 2024-09-25 | End: 2024-09-27 | Stop reason: HOSPADM

## 2024-09-25 RX ORDER — TRANEXAMIC ACID 100 MG/ML
1000 INJECTION, SOLUTION INTRAVENOUS ONCE AS NEEDED
Status: DISCONTINUED | OUTPATIENT
Start: 2024-09-25 | End: 2024-09-27 | Stop reason: HOSPADM

## 2024-09-25 RX ORDER — LIDOCAINE 560 MG/1
1 PATCH PERCUTANEOUS; TOPICAL; TRANSDERMAL
Status: DISCONTINUED | OUTPATIENT
Start: 2024-09-25 | End: 2024-09-27 | Stop reason: HOSPADM

## 2024-09-25 RX ORDER — LOPERAMIDE HYDROCHLORIDE 2 MG/1
4 CAPSULE ORAL EVERY 2 HOUR PRN
Status: DISCONTINUED | OUTPATIENT
Start: 2024-09-25 | End: 2024-09-27 | Stop reason: HOSPADM

## 2024-09-25 RX ORDER — BISACODYL 10 MG/1
10 SUPPOSITORY RECTAL DAILY PRN
Status: DISCONTINUED | OUTPATIENT
Start: 2024-09-25 | End: 2024-09-27 | Stop reason: HOSPADM

## 2024-09-25 RX ORDER — ACETAMINOPHEN 325 MG/1
975 TABLET ORAL EVERY 6 HOURS
Status: DISCONTINUED | OUTPATIENT
Start: 2024-09-25 | End: 2024-09-27 | Stop reason: HOSPADM

## 2024-09-25 RX ORDER — CARBOPROST TROMETHAMINE 250 UG/ML
250 INJECTION, SOLUTION INTRAMUSCULAR ONCE AS NEEDED
Status: DISCONTINUED | OUTPATIENT
Start: 2024-09-25 | End: 2024-09-27 | Stop reason: HOSPADM

## 2024-09-25 RX ADMIN — IBUPROFEN 600 MG: 600 TABLET, FILM COATED ORAL at 08:15

## 2024-09-25 RX ADMIN — ACETAMINOPHEN 975 MG: 325 TABLET ORAL at 08:15

## 2024-09-25 RX ADMIN — IBUPROFEN 600 MG: 600 TABLET, FILM COATED ORAL at 14:07

## 2024-09-25 RX ADMIN — ACETAMINOPHEN 975 MG: 325 TABLET ORAL at 14:06

## 2024-09-25 RX ADMIN — IBUPROFEN 600 MG: 600 TABLET, FILM COATED ORAL at 20:55

## 2024-09-25 RX ADMIN — ACETAMINOPHEN 975 MG: 325 TABLET ORAL at 20:54

## 2024-09-25 RX ADMIN — Medication 60 MILLI-UNITS/MIN: at 02:44

## 2024-09-25 ASSESSMENT — PAIN SCALES - GENERAL
PAINLEVEL_OUTOF10: 6
PAINLEVEL_OUTOF10: 0 - NO PAIN
PAINLEVEL_OUTOF10: 0 - NO PAIN
PAINLEVEL_OUTOF10: 8
PAINLEVEL_OUTOF10: 0 - NO PAIN
PAINLEVEL_OUTOF10: 3
PAINLEVEL_OUTOF10: 0 - NO PAIN

## 2024-09-25 ASSESSMENT — PAIN DESCRIPTION - DESCRIPTORS: DESCRIPTORS: SORE

## 2024-09-25 NOTE — PROGRESS NOTES
Intrapartum Progress Note    Assessment/Plan   Della Oconnell is a 24 y.o.  at 39w0d, BIANKA: 10/1/2024, by CRL Ultrasound admitted for IOL in s/o FGR <10%ile    IOL  Method: s/p cervical ripening with CRB and cyto, on pitocin, for AROM   Pain management: epidural per patient request   GBS: pos, on PCN ppx   Next exam: as clinically indicated by maternal or fetal status  Patient declining POCT glucose checks    Fetal Status   CEFM, currently Category I    Pregnancy notable for:  FGR <10%ile  elevated 1hr (139) with no 3hr or fingersticks  hx PPH (1.3L), T&C x1 unit  class 3 obesity with excessive weight gain  depression, anxiety (no meds or therapy)  chlamydia tx     Seen and discussed with Dr. Wojciech Blood MD  PGY1, Obstetrics and Gynecology      Subjective   She reports she is doing well. Feeling her contractions, sitting on birth ball. Has support team with her. No concerns at this time.     Objective   Last Vitals:  Temp Pulse Resp BP MAP Pulse Ox   36.4 °C (97.5 °F) 87 17 116/65   100 %     Vitals Min/Max Last 24 Hours:  Temp  Min: 36 °C (96.8 °F)  Max: 36.7 °C (98.1 °F)  Pulse  Min: 70  Max: 101  Resp  Min: 17  Max: 19  BP  Min: 112/79  Max: 136/91    Intake/Output:    Intake/Output Summary (Last 24 hours) at 2024 2357  Last data filed at 2024 1858  Gross per 24 hour   Intake 1041 ml   Output --   Net 1041 ml       Physical Examination:  General: no acute distress  HEENT: normocephalic, atraumatic  Heart: warm and well perfused  Lungs: breathing comfortably on room air  Abdomen: gravid  Extremities: moving all extremities  Neuro: awake and conversant  Psych: appropriate mood and affect    SVE: per day team  FHT: 140/mod/+accel/1x early decel  [place]: q3-4min    Lab Review:  Labs in chart have been reviewed

## 2024-09-25 NOTE — ANESTHESIA PREPROCEDURE EVALUATION
Patient: Della Oconnell    Evaluation Method: In-person visit    Procedure Information    Date: 09/24/24  Procedure: Labor Consult         Relevant Problems   Neuro   (+) Anxiety   (+) Depression      ID   (+) Chlamydia      GYN   (+) 37 weeks gestation of pregnancy ([facility])       Clinical information reviewed:   Tobacco  Allergies  Meds  Problems  Med Hx  Surg Hx   Fam Hx          NPO Detail:  No data recorded     OB/Gyn Evaluation    Present Pregnancy    Patient is pregnant now.  (+) , fetal growth restriction   Obstetric History    (+) hypertensive disorder of pregnancy - preeclampsia, obstetric hemorrhage              Physical Exam    Airway  Mallampati: I  TM distance: >3 FB     Cardiovascular - normal exam     Dental - normal exam     Pulmonary - normal exam     Abdominal     Comments: Pregnant         Vitals:    09/24/24 2050   BP: 118/60   Pulse: 78   Resp:    Temp:    SpO2: 100%       Chemistry    Lab Results   Component Value Date/Time     (L) 02/26/2024 1546    K 4.2 02/26/2024 1546     02/26/2024 1546    CO2 23 02/26/2024 1546    BUN 10 02/26/2024 1546    CREATININE 0.68 02/26/2024 1546    Lab Results   Component Value Date/Time    CALCIUM 9.4 02/26/2024 1546    ALKPHOS 65 02/26/2024 1546    AST 11 02/26/2024 1546    ALT 17 02/26/2024 1546    BILITOT 0.4 02/26/2024 1546          Lab Results   Component Value Date/Time    WBC 9.1 09/24/2024 1211    HGB 11.9 (L) 09/24/2024 1211    HCT 36.0 09/24/2024 1211     09/24/2024 1211     Lab Results   Component Value Date/Time    PROTIME 10.6 04/19/2023 0609    INR 0.9 04/19/2023 0609     No results found for this or any previous visit (from the past 4464 hour(s)).      Anesthesia Plan    History of general anesthesia?: no  History of complications of general anesthesia?: unknown/emergency    ASA 2     epidural     Anesthetic plan and risks discussed with patient.  Use of blood products discussed with patient who consented to blood products.     Plan discussed with resident.

## 2024-09-25 NOTE — SIGNIFICANT EVENT
Patient feeling pressure during contractions. Doing well.    SVE:   FHT: 130/mod/+accel/variables and early decels   [place]: q2-3 min    Active labor   Pitocin infusing, increase per protocol   Cat 2 tracing currently though overall reassuring with low suspicion for acid-base disturbance of the fetus given moderate variability and accelerations, CEFM  Next exam as clinically indicated by maternal or fetal status    Discussed with Dr. Wojciech Blood MD  PGY1, Obstetrics and Gynecology

## 2024-09-25 NOTE — L&D DELIVERY NOTE
OB Delivery Note  2024  Della Oconnell  24 y.o.   Vaginal, Spontaneous       Gestational Age: 39w1d  /Para:   Quantitative Blood Loss: Admission to Discharge: 181 mL (2024 10:31 AM - 2024  4:58 AM)    24 y.o.  underwent induction in the s/o FGR <10%ile and delivered at 39w1d.     Uncomplicated . Postpartum pitocin bolus initiated. Vigorous infant placed on maternal abdomen for skin to skin. Delayed cord clamping. Placenta delivered spontaneously and intact with gentle traction on umbilical cord. Fundus palpated firm, midline, and at umbilicus. Vagina, perineum, and labia inspected. Small laceration at posterior vaginal introitus, pressure held. Good hemostasis noted.       Ania Oconnell [[id number]]      Labor Events    Sac identifier: Sac 1  Rupture date/time: 2024  Rupture type: Artificial  Fluid color: Clear  Labor type: Induced Onset of Labor  Labor allowed to proceed with plans for an attempted vaginal birth?: Yes  Induction: Oxytocin  Induction indications: Risk Reducing  Complications: None       Labor Event Times    Labor onset date/time: 2024 1031  Dilation complete date/time: 2024  Start pushing date/time: 2024       Labor Length    1st stage: 39h 40m  2nd stage: 0h 12m  3rd stage: 0h 10m       Placenta    Placenta delivery date/time: 2024 0233  Placenta removal: Spontaneous  Placenta appearance: Intact  Placenta disposition: pathology       Cord    Vessels: 3 vessels  Complications: None  Delayed cord clamping?: Yes  Cord clamped date/time: 2024 02:24:00  Cord blood disposition: Discarded  Gases sent?: No  Stem cell collection (by provider): No       Lacerations    Episiotomy: None  Perineal laceration: None  Vaginal laceration?: Yes  Vaginal laceration repaired?: No  Repair suture: None       Anesthesia    Method: Epidural       Operative Delivery    Forceps attempted?: No  Vacuum extractor attempted?: No       Shoulder  Dystocia    Shoulder dystocia present?: No        Delivery    Time head delivered: 2024 02:23:00  Birth date/time: 2024 02:23:00  Delivery type: Vaginal, Spontaneous  Complications: None       Resuscitation    Method: Suctioning, Tactile stimulation       Apgars    Living status: Living  Apgar Component Scores:  1 min.:  5 min.:  10 min.:  15 min.:  20 min.:    Skin color:  0  1       Heart rate:  2  2       Reflex irritability:  2  2       Muscle tone:  2  2       Respiratory effort:  2  2       Total:  8  9       Apgars assigned by: MARIA EUGENIA GUERRIER       Delivery Providers    Delivering clinician: Shai Jeffrey MD   Provider Role    Leslie Rubio RN Delivery Nurse    Suzanna Jose, RN Nursery Nurse    Glenda Blood MD Resident                 Delivery completed with Dr. Wojciech Blood MD  PGY1, Obstetrics and Gynecology

## 2024-09-25 NOTE — SIGNIFICANT EVENT
To room for AROM. Patient doing well.     SVE: 5/50/-3  FHT: 140/mod/+accel/-decel  [place]: q2-3min     Latent labor   AROM complete, clear fluid.   Continue pitocin per protocol  Cat 1 fetal tracing     Seen and discussed with Dr. Wojciech Blood MD  PGY1, Obstetrics and Gynecology       rolling walker

## 2024-09-25 NOTE — DISCHARGE INSTRUCTIONS

## 2024-09-25 NOTE — LACTATION NOTE
Lactation Consultant Note  Lactation Consultation  Reason for Consult: Initial assessment, Other (Comment) (patient requested assistance with latching)  Consultant Name: VIRGIE Da Silva RN, IBCLC    Maternal Information  Has mother  before?: Yes  How long did the mother previously breastfeed?: 3 months  Infant to breast within first 2 hours of birth?: Yes  Exclusive Pump and Bottle Feed: No (but, plans to do a combination of latching and pumping at home)    Maternal Assessment  Breast Assessment: Large, Symmetrical, Compressible, Other (Comment), Soft (very expressible)  Nipple Assessment: Intact, Erect  Areola Assessment: Normal    Infant Assessment  Infant Behavior: Readiness to feed, Feeding cues observed, Suckles on and off, needs stimulation, Content after feeding  Infant Assessment: Palate - high/arch/bubble/normal, Tongue humped/bunched/retracted/elevated    Feeding Assessment  Nutrition Source: Breastmilk  Feeding Method: Nursing at the breast  Feeding Position: C - hold, Both sides, Football/seated, Nipple to nose, Mother needs assistance with latch/positioning  Suck/Feeding: Sustained, Coordinated suck/swallow/breathe, Tactile stimulation needed, Audible swallowing with stimulaton  Latch Assessment: Moderate assistance is needed, Instructed on deep latch, Areolar attachment, Deep latch obtained, Optimal angle of mouth opening, Comfortable with no pain, Chin and lower lip contact breast first, Flanged lips    LATCH TOOL  Latch: Repeated attempts, hold nipple in mouth, stimulate to suck  Audible Swallowing: A few with stimulation  Type of Nipple: Everted (After stimulation)  Comfort (Breast/Nipple): Soft/non-tender  Hold (Positioning): Minimal assist, teach one side, mother does other, staff holds  LATCH Score: 7    Breast Pump       Other OB Lactation Tools       Patient Follow-up  Inpatient Lactation Follow-up Needed : Yes  Outpatient Lactation Follow-up: Recommended    Other OB Lactation  Documentation  Maternal Risk Factors: Extreme tiredness, fatigue or stress, Other (comment) (history of anxiety / depression)  Infant Risk Factors: Other (comment), Early term birth 37-39 weeks (SGA)    Recommendations/Summary  Called to room to see patient.   Mom stated the baby isn't latching to the breast - needed assistance.     Mom breast fed her first for 3 months and her feeding plan for this baby is to eventually do a combination OF breast feeding and pumping.     Offered to assist with latch and mom was receptive.   Reviewed positioning of baby (in football hold on both breasts),  use of pillow support, hand placement on baby and on breast, expression of colostrum to the nipple prior to latching, latching technique, and use of breast compression and stimulation to keep the baby feeding at the breast longer.    Mom is very expressible and baby does latch and suckles for a few minutes with a few swallow noted. Then the baby bunches the tongue and pushes mom out but,is still looking to eat. Baby is only 7 hours of life.   Deep latch obtained on both breast and mom stated the latch as comfortable.   Noted swallows.     Reviewed feeding cues, the normal feeding patterns in the first 24 hours of life, the role of colostrum, output on different days of life, milk production/ supply in the first few days of life, and the benefits of skin to skin.      Mom has a breast pump for at home.     Encouraged her to utilize the outpatient lactation resources, if needed.   Contact information given.   [phone] [place] or [phone] [place]

## 2024-09-25 NOTE — HOSPITAL COURSE
Della Oconnell is an {baby size:[id number]} Gestational Age: <None> female No birth weight on file. born via  on 2000 at ,  to a This patient's mother is not on file. This patient's mother is not on file. mother with blood type B+ Ab- and PNS WNL except GBS pos. Active issues of ***.    Delivery history:  Apgars   at 1min,  at 5min  Resuscitation:    Rupture of Membranes Duration: This patient's mother is not on file.  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 (Facility age limit for growth %amanda is 20 years.)  Length:   (Facility age limit for growth %amanda is 20 years.)  Head circumference:   (Facility age limit for growth %amanda is 20 years.)

## 2024-09-25 NOTE — SIGNIFICANT EVENT
Patient feeling more pressure. To bedside for exam. Lying comfortably in bed, with epidural infusing.    SVE: ant lip/100/0  FHT: 130/mod/+accel/-decel  [place]: q1-2min     Active labor  Pitocin paused in the setting of tachysystole.   Epidural infusing   Cat 1 fetal tracing     Seen and discussed with Dr. Dyana Blood MD  PGY1, Obstetrics and Gynecology

## 2024-09-25 NOTE — ANESTHESIA PROCEDURE NOTES
Epidural Block    Patient location during procedure: OB  Start time: 9/24/2024 9:46 PM  End time: 9/24/2024 10:31 PM  Reason for block: labor analgesia  Staffing  Performed: resident   Authorized by: Rizwan Washburn MD    Performed by: Will Patterson DO    Preanesthetic Checklist  Completed: patient identified, IV checked, risks and benefits discussed, surgical consent, pre-op evaluation, timeout performed and sterile techniques followed  Block Timeout  RN/Licensed healthcare professional reads aloud to the Anesthesia provider and entire team: Patient identity, procedure with side and site, patient position, and as applicable the availability of implants/special equipment/special requirements.  Patient on coagulant treatment: no  Timeout performed at: 9/24/2024 9:50 PM  Block Placement  Patient position: sitting  Prep: ChloraPrep  Sterility prep: cap, drape, gloves, hand and mask  Sedation level: no sedation  Patient monitoring: blood pressure, continuous pulse oximetry and heart rate  Approach: midline  Local numbing: lidocaine 1% to skin and subcutaneous tissues  Vertebral space: lumbar  Epidural  Loss of resistance technique: saline  Guidance: landmark technique        Needle  Needle type: Tuohy   Needle gauge: 17  Needle length: 8.9cm  Needle insertion depth: 7 cm  Catheter type: end hole  Catheter size: 19 G  Catheter at skin depth: 11 cm  Catheter securement method: clear occlusive dressing and liquid medical adhesive    Test dose: lidocaine 1.5% with epinephrine 1-to-200,000  Test dose: lidocaine 1.5% with epinephrine 1-to-200,000  Test dose result: no positive test dose    PCEA  Medication concentration used: 0.044% Bupivacaine with 1.25 mcg/mL Fentanyl and [id number] Epinephrine  Dose (mL): 10  Lockout (minutes): 15  1-Hour Limit (boluses/hr): 4  Basal Rate: 14        Assessment  Sensory level: other (T8) bilateral  Block outcome: patient comfortable  Number of attempts: 3 or more  Events: paresthesia  and no positive test dose  Paresthesia: left  Paresthesia resolved: yes  Procedure assessment: patient tolerated procedure well with no immediate complications  Additional Notes  Initial attempt by resident at L3-4 with loss at 7cm. Patient reported left hip pain/paresthesia with threading of catheter. Catheter and needle withdrawn from epidural space. Second attempt at same level with needle moved to the right. Loss again at 7cm and patient with recurrent left-sided paresthesia upon threading catheter. 3rd attempt at L4-5 with os encountered, unsuccessful. Next attempt by attending at L2-3 with loss at 7cm. No reported paresthesias. All paresthesias resolved immediately. Patient tolerated procedure well.

## 2024-09-26 PROCEDURE — 2500000001 HC RX 250 WO HCPCS SELF ADMINISTERED DRUGS (ALT 637 FOR MEDICARE OP)

## 2024-09-26 PROCEDURE — 1100000001 HC PRIVATE ROOM DAILY

## 2024-09-26 RX ADMIN — IBUPROFEN 600 MG: 600 TABLET, FILM COATED ORAL at 21:36

## 2024-09-26 RX ADMIN — IBUPROFEN 600 MG: 600 TABLET, FILM COATED ORAL at 15:57

## 2024-09-26 RX ADMIN — ACETAMINOPHEN 975 MG: 325 TABLET ORAL at 15:57

## 2024-09-26 RX ADMIN — ACETAMINOPHEN 975 MG: 325 TABLET ORAL at 09:31

## 2024-09-26 RX ADMIN — ACETAMINOPHEN 975 MG: 325 TABLET ORAL at 03:25

## 2024-09-26 RX ADMIN — ACETAMINOPHEN 975 MG: 325 TABLET ORAL at 21:36

## 2024-09-26 RX ADMIN — IBUPROFEN 600 MG: 600 TABLET, FILM COATED ORAL at 03:24

## 2024-09-26 RX ADMIN — IBUPROFEN 600 MG: 600 TABLET, FILM COATED ORAL at 09:31

## 2024-09-26 ASSESSMENT — PAIN DESCRIPTION - DESCRIPTORS
DESCRIPTORS: ACHING;SORE
DESCRIPTORS: SORE;ACHING

## 2024-09-26 ASSESSMENT — PAIN SCALES - GENERAL
PAINLEVEL_OUTOF10: 4
PAINLEVEL_OUTOF10: 4

## 2024-09-26 NOTE — ANESTHESIA POSTPROCEDURE EVALUATION
Patient: Della Oconnell    Procedure Summary       Date: 24 Room / Location:     Anesthesia Start:  Anesthesia Stop: 24    Procedure: Labor Analgesia Diagnosis:     Scheduled Providers:  Responsible Provider: Rizwan Washburn MD    Anesthesia Type: epidural ASA Status: 2            Anesthesia Type: epidural        Anesthesia Post Evaluation    Patient location during evaluation: PACU  Patient participation: complete - patient participated  Level of consciousness: awake and alert  Pain management: adequate  Airway patency: patent  Cardiovascular status: acceptable and hemodynamically stable  Respiratory status: acceptable and room air  Hydration status: acceptable  Postoperative Nausea and Vomiting: none  Comments: Della Oconnell is a 24 y.o., , who had a Vaginal, Spontaneous delivery on 2024 at 39w1d and is now POD1.    She had Neuraxial Anesthesia without immediate complications noted.       Pain well controlled    ---------------------------               24                      0015         ---------------------------   BP:           98/63         Pulse:         66           Resp:          16           Temp:   36.2 °C (97.2 °F)   SpO2:          98%         ---------------------------    Neuraxial site assessed. No visible redness or swelling or drainage. Patient able to ambulate and move all extremities without difficulty. Able to void. No complaints of nausea/vomiting. Tolerating PO intake well. No s/sx of PDPH.     Anesthesia will sign off     EVELYN Thompson          No notable events documented.

## 2024-09-26 NOTE — PROGRESS NOTES
Social Work Assessment       Patient: Della Oconnell   Address: [address] [facility] [address]  Phone: [phone]    Referral Reason: Depression, Anxiety     Prenatal Care: Yes     Name: Rainer Price : 25    Other Children: Yes, 24 (F)    Household Composition: Lives in stable housing with her mother, and eighteen month old daughter, Melissa     IPV/DV or Safety Concerns: Denies     Car-Seat: Yes   Safe Sleep Space: Yes   Safe Sleep Education: Yes     Transportation Concerns: She does not have a working vehicle at this time.  Patient is aware of transportation services offered through her insurance (Provide A Ride).  Family members provide transportation to/from medical appts when needed    School/Work/Income: [employer]     Insurance: [employer] [facility]     Mental Health Diagnoses: Depression, Anxiety   Medication(s): Past- Lexapro, Mirtazapine  Counseling: Past-   History of inpatient stay 2022, for passive SI and severe depression     Supports: Family members     Substance Use History: Denies current use     Toxicology Screens: N/A     Department of Children and Family Services (DCFS): N/A    Assessment: SW met with patient at bedside to reintroduce self, and SW role.  SW is familiar with patient from previous delivery.  Ms. Oconnell was friendly, receptive, and easy to engage in conversation.  Mother of baby reports having supplies needed to care for , including car seat, and crib/bassinet.  Parent verbalized understanding of the ABC's of safe sleep.  Mobile follow-up at [place].  Ms. Oconnell receives services through [facility].  She plans to apply for SNAP benefits due to missing her redetermination period.  She's also planning to apply for WIC.  SW provided  resource list.  And, agreed to complete a referral to [facility] with patient's consent    Ms. Oconnell endorses history of depression, anxiety, and PPD.  Patient reports stable mood, but is open to a new  counseling referral due to mental health history.  Per chart review, and patient report she had an inpatient stay 2022, due to SI and depression.  Signs/symptoms of PPD reviewed.  Ms. Oconnell denies having any other unmet needs at this time.   completed a referral to  [facility] on this date     Plan: Ms. Oconnell MRN: [id number], and  lois Spear MRN: [id number], are appropriate for discharge from  perspective    Vicky ELLISA LSW

## 2024-09-26 NOTE — PROGRESS NOTES
Postpartum Progress Note      Assessment/Plan       Della Oconnell is a 24 y.o., , who initially presented for IOL in s/o FGR. She had a Vaginal, Spontaneous delivery on 2024 at 39w1d and is now PPD1.        - continue routine care  - pain well controlled on ERAS protocol  - HMG 11.9 ->   - DVT Score: 5 SCDs and enoxaparin prophylactic dose daily while inpatient  - Pt's blood type is B POS. Rhogam is not indicated.    Dep/anx  - no meds  - not interested in therapy/counseling  - denies emotional concerns or need for medication at this time    Chlamydia  - tested positive  and treated  - will need RADHA at PP visit    Elevated BP without diagnosis of HDP  - mild range BP   - normotensive since during PP period  - asymptomatic    Contraception  Defers contraception to primary OB/PP visit. We discussed pregnancy spacing of at least one year, abstaining from intercourse for 6wks, and the ability to become pregnant in the absence of regular menses. Pt verbalized understanding.     Maternal Well-Being  - emotional support provided  - bonding with infant  - [place] feeding: breastfeeding/pumping encouraged; lactation consult prn     Dispo  - anticipate d/c on PPD#2-3 if meeting all post-op and postpartum milestones    - The signs and symptoms of PEC were reviewed with the patient, including unrelenting headache, vision changes/blurred vision, and RUQ pain    - On discharge, follow up with primary OB in:       - 4-6 weeks for post-partum visit       Principal Problem:    Encounter for planned induction of labor ([facility])  Active Problems:    Anxiety    Depression    History of postpartum hemorrhage    BMI 45.0-49.9, adult (Multi)    Poor fetal growth affecting management of mother in third trimester ([facility])    Elevated glucose tolerance test        Subjective   Her pain is moderately controlled with current medications  She is passing flatus  She is ambulating independently  She is tolerating a  Adult diet Regular  She is voiding spontaneously  She reports no breast or nursing problems  She denies emotional concerns today     Denies CP, SOB, RUQ pain, HA, scotoma, vision changes.    Objective   Last Vitals:  Temp Pulse Resp BP MAP Pulse Ox   36.3 °C (97.3 °F) 76 17 101/70   99 %       Physical Exam:  General: well appearing, well nourished, postpartum  Obstetric: fundus firm below umbilicus, lochia light  Skin: Warm, dry; no rashes/lesions/erythema  Neuro: A/Ox3, no gross motor deficit   GI: no distension, appropriately tender, soft  Respiratory: Even and unlabored on RA  Cardiovascular: Trace BLE edema; No erythema, warmth  Psych: appropriate mood and affect      Lab Data:  Lab Results   Component Value Date    WBC 9.1 09/24/2024    HGB 11.9 (L) 09/24/2024    HCT 36.0 09/24/2024     09/24/2024

## 2024-09-27 ENCOUNTER — PHARMACY VISIT (OUTPATIENT)
Dept: PHARMACY | Facility: CLINIC | Age: 24
End: 2024-09-27
Payer: MEDICAID

## 2024-09-27 VITALS
OXYGEN SATURATION: 98 % | SYSTOLIC BLOOD PRESSURE: 112 MMHG | WEIGHT: 255.29 LBS | TEMPERATURE: 97.9 F | HEIGHT: 63 IN | DIASTOLIC BLOOD PRESSURE: 78 MMHG | RESPIRATION RATE: 16 BRPM | HEART RATE: 75 BPM | BODY MASS INDEX: 45.23 KG/M2

## 2024-09-27 PROCEDURE — 2500000001 HC RX 250 WO HCPCS SELF ADMINISTERED DRUGS (ALT 637 FOR MEDICARE OP)

## 2024-09-27 PROCEDURE — 2500000004 HC RX 250 GENERAL PHARMACY W/ HCPCS (ALT 636 FOR OP/ED)

## 2024-09-27 PROCEDURE — RXMED WILLOW AMBULATORY MEDICATION CHARGE

## 2024-09-27 RX ORDER — IBUPROFEN 600 MG/1
600 TABLET ORAL EVERY 6 HOURS
Qty: 120 TABLET | Refills: 0 | Status: SHIPPED | OUTPATIENT
Start: 2024-09-27 | End: 2024-10-27

## 2024-09-27 RX ORDER — POLYETHYLENE GLYCOL 3350 17 G/17G
17 POWDER, FOR SOLUTION ORAL DAILY
Qty: 28 PACKET | Refills: 0 | Status: SHIPPED | OUTPATIENT
Start: 2024-09-27 | End: 2024-10-27

## 2024-09-27 RX ORDER — ACETAMINOPHEN 325 MG/1
975 TABLET ORAL EVERY 6 HOURS
Qty: 360 TABLET | Refills: 0 | Status: SHIPPED | OUTPATIENT
Start: 2024-09-27 | End: 2024-10-27

## 2024-09-27 RX ADMIN — ACETAMINOPHEN 975 MG: 325 TABLET ORAL at 03:02

## 2024-09-27 RX ADMIN — IBUPROFEN 600 MG: 600 TABLET, FILM COATED ORAL at 08:55

## 2024-09-27 RX ADMIN — IBUPROFEN 600 MG: 600 TABLET, FILM COATED ORAL at 03:02

## 2024-09-27 RX ADMIN — ACETAMINOPHEN 975 MG: 325 TABLET ORAL at 08:55

## 2024-09-27 RX ADMIN — ENOXAPARIN SODIUM 60 MG: 60 INJECTION SUBCUTANEOUS at 03:01

## 2024-09-27 ASSESSMENT — PAIN SCALES - GENERAL
PAINLEVEL_OUTOF10: 0 - NO PAIN
PAINLEVEL_OUTOF10: 0 - NO PAIN

## 2024-09-27 NOTE — DISCHARGE SUMMARY
Discharge Summary    Admission Date: 2024  Discharge Date: 24     Discharge Diagnosis  Encounter for planned induction of labor    Hospital Course  Delivery Date: 2024 2:23 AM  Delivery type: Vaginal, Spontaneous   GA at delivery: 39w1d  Outcome: Living  Anesthesia during delivery: Epidural  Intrapartum complications: None  Feeding method: Breastfeeding Status: Yes     Procedures: none  Contraception at discharge: none. Defers contraception to primary OB/PP visit. We discussed pregnancy spacing of at least one year, abstaining from intercourse for 6wks, and the ability to become pregnant in the absence of regular menses. Pt verbalized understanding.     Della Oconnell is a 24 y.o., , who initially presented for IOL in s/o FGR. She had a Vaginal, Spontaneous delivery on 2024 at 39w1d and is now PPD2.     Dep/anx  - no meds  - not interested in therapy/counseling  - denies emotional concerns or need for medication at this time     Chlamydia  - tested positive  and treated  - will need RADHA at PP visit     Elevated BP without diagnosis of HDP  - mild range BP   - normotensive since during PP period  - asymptomatic    Meeting all postpartum milestones. OK for DC today and follow up as below.   Follow up with your OB provider in 4-6 weeks for postpartum visit     Pertinent Physical Exam At Time of Discharge    General: Examination reveals a well developed, well nourished, female, in no acute distress. She is alert and cooperative.  Lungs: symmetrical, non-labored breathing.  Cardiac: warm, well-perfused.  Abdomen: soft, non-tender.  Fundus: firm, below umbilicus, and nontender.  Extremities: no redness or tenderness in the calves or thighs.  Neurological: alert, oriented, normal speech, no focal findings or movement disorder noted.     Last Vitals:  Temp Pulse Resp BP MAP Pulse Ox   36.6 °C (97.9 °F) 75 16 112/78 89 98 %     Discharge Meds     Your medication list        START taking these  medications        Instructions Last Dose Given Next Dose Due   acetaminophen 325 mg tablet  Commonly known as: Tylenol      Take 3 tablets (975 mg) by mouth every 6 hours.       ibuprofen 600 mg tablet      Take 1 tablet (600 mg) by mouth every 6 hours.       polyethylene glycol 17 gram packet  Commonly known as: Glycolax, Miralax      Take 17 g by mouth once daily.              CONTINUE taking these medications        Instructions Last Dose Given Next Dose Due   aspirin 81 mg EC tablet           docusate sodium 100 mg capsule  Commonly known as: Colace      Take 1 capsule (100 mg) by mouth as needed at bedtime for constipation.       Prenatal Gummy 400 mcg-35 mg -25 mg-5 mg tablet,chewable  Generic drug: [id number]-epa-[id number] oil      Chew 1 tablet once daily.                 Where to Get Your Medications        These medications were sent to [facility]  [address] [place] [address]      Hours: 8:30 AM to 5 PM Mon-Fri Phone: [phone]   acetaminophen 325 mg tablet  ibuprofen 600 mg tablet  polyethylene glycol 17 gram packet          Complications Requiring Follow-Up  None    Test Results Pending At Discharge  Pending Labs       Order Current Status    Surgical Pathology Exam - PLACENTA In process            Outpatient Follow-Up  No future appointments.    I spent 20 minutes in the professional and overall care of this patient.      Amelia Aden, APRN-CNP

## 2024-09-27 NOTE — CARE PLAN
The patient's goals for the shift include bond with baby.    The clinical goals for the shift include VS remain WNL until end of shift      Problem: Pain  Goal: Takes deep breaths with improved pain control throughout the shift  Outcome: Met     Della cleared for discharge by provider!

## 2024-09-28 LAB
BLOOD EXPIRATION DATE: NORMAL
DISPENSE STATUS: NORMAL
PRODUCT BLOOD TYPE: 7300
PRODUCT CODE: NORMAL
UNIT ABO: NORMAL
UNIT NUMBER: NORMAL
UNIT RH: NORMAL
UNIT VOLUME: 350
XM INTEP: NORMAL

## 2024-09-30 ENCOUNTER — APPOINTMENT (OUTPATIENT)
Dept: OBSTETRICS AND GYNECOLOGY | Facility: CLINIC | Age: 24
End: 2024-09-30
Payer: COMMERCIAL

## 2024-09-30 LAB
LABORATORY COMMENT REPORT: NORMAL
PATH REPORT.FINAL DX SPEC: NORMAL
PATH REPORT.GROSS SPEC: NORMAL
PATH REPORT.RELEVANT HX SPEC: NORMAL
PATH REPORT.TOTAL CANCER: NORMAL

## 2024-10-09 ENCOUNTER — APPOINTMENT (OUTPATIENT)
Dept: BEHAVIORAL HEALTH | Facility: CLINIC | Age: 24
End: 2024-10-09
Payer: COMMERCIAL

## 2024-10-11 ENCOUNTER — APPOINTMENT (OUTPATIENT)
Dept: BEHAVIORAL HEALTH | Facility: CLINIC | Age: 24
End: 2024-10-11
Payer: COMMERCIAL

## 2024-10-16 ENCOUNTER — APPOINTMENT (OUTPATIENT)
Dept: BEHAVIORAL HEALTH | Facility: CLINIC | Age: 24
End: 2024-10-16
Payer: COMMERCIAL

## 2024-12-15 ENCOUNTER — HOSPITAL ENCOUNTER (EMERGENCY)
Facility: HOSPITAL | Age: 24
Discharge: HOME | End: 2024-12-15
Payer: COMMERCIAL

## 2024-12-15 VITALS
DIASTOLIC BLOOD PRESSURE: 78 MMHG | TEMPERATURE: 98.2 F | RESPIRATION RATE: 16 BRPM | HEART RATE: 76 BPM | HEIGHT: 63 IN | WEIGHT: 230 LBS | BODY MASS INDEX: 40.75 KG/M2 | SYSTOLIC BLOOD PRESSURE: 126 MMHG | OXYGEN SATURATION: 100 %

## 2024-12-15 DIAGNOSIS — N76.0 BACTERIAL VAGINOSIS: ICD-10-CM

## 2024-12-15 DIAGNOSIS — N39.0 URINARY TRACT INFECTION WITHOUT HEMATURIA, SITE UNSPECIFIED: ICD-10-CM

## 2024-12-15 DIAGNOSIS — R23.8 VESICULAR LESION: Primary | ICD-10-CM

## 2024-12-15 DIAGNOSIS — B96.89 BACTERIAL VAGINOSIS: ICD-10-CM

## 2024-12-15 LAB
APPEARANCE UR: CLEAR
BILIRUB UR STRIP.AUTO-MCNC: NEGATIVE MG/DL
CLUE CELLS SPEC QL WET PREP: PRESENT
COLOR UR: COLORLESS
GLUCOSE UR STRIP.AUTO-MCNC: NORMAL MG/DL
HCV AB SER QL: NONREACTIVE
HIV 1+2 AB+HIV1 P24 AG SERPL QL IA: NONREACTIVE
KETONES UR STRIP.AUTO-MCNC: NEGATIVE MG/DL
LEUKOCYTE ESTERASE UR QL STRIP.AUTO: ABNORMAL
NITRITE UR QL STRIP.AUTO: NEGATIVE
PH UR STRIP.AUTO: 6.5 [PH]
PREGNANCY TEST URINE, POC: NEGATIVE
PROT UR STRIP.AUTO-MCNC: NEGATIVE MG/DL
RBC # UR STRIP.AUTO: NEGATIVE /UL
RBC #/AREA URNS AUTO: ABNORMAL /HPF
SP GR UR STRIP.AUTO: 1.01
SQUAMOUS #/AREA URNS AUTO: ABNORMAL /HPF
T VAGINALIS SPEC QL WET PREP: ABNORMAL
TRICHOMONAS REFLEX COMMENT: ABNORMAL
UROBILINOGEN UR STRIP.AUTO-MCNC: NORMAL MG/DL
WBC #/AREA URNS AUTO: ABNORMAL /HPF
WBC VAG QL WET PREP: ABNORMAL
YEAST VAG QL WET PREP: ABNORMAL

## 2024-12-15 PROCEDURE — 87389 HIV-1 AG W/HIV-1&-2 AB AG IA: CPT

## 2024-12-15 PROCEDURE — 99284 EMERGENCY DEPT VISIT MOD MDM: CPT

## 2024-12-15 PROCEDURE — 87210 SMEAR WET MOUNT SALINE/INK: CPT

## 2024-12-15 PROCEDURE — 81025 URINE PREGNANCY TEST: CPT

## 2024-12-15 PROCEDURE — 86803 HEPATITIS C AB TEST: CPT

## 2024-12-15 PROCEDURE — 87661 TRICHOMONAS VAGINALIS AMPLIF: CPT

## 2024-12-15 PROCEDURE — 86780 TREPONEMA PALLIDUM: CPT

## 2024-12-15 PROCEDURE — 99283 EMERGENCY DEPT VISIT LOW MDM: CPT

## 2024-12-15 PROCEDURE — 87529 HSV DNA AMP PROBE: CPT

## 2024-12-15 PROCEDURE — 36415 COLL VENOUS BLD VENIPUNCTURE: CPT

## 2024-12-15 PROCEDURE — 81001 URINALYSIS AUTO W/SCOPE: CPT

## 2024-12-15 PROCEDURE — 87491 CHLMYD TRACH DNA AMP PROBE: CPT

## 2024-12-15 PROCEDURE — 87086 URINE CULTURE/COLONY COUNT: CPT

## 2024-12-15 RX ORDER — METRONIDAZOLE 500 MG/1
500 TABLET ORAL 2 TIMES DAILY
Qty: 20 TABLET | Refills: 0 | Status: SHIPPED | OUTPATIENT
Start: 2024-12-15 | End: 2024-12-25

## 2024-12-15 RX ORDER — LIDOCAINE AND PRILOCAINE 25; 25 MG/G; MG/G
CREAM TOPICAL AS NEEDED
Qty: 1 G | Refills: 0 | Status: SHIPPED | OUTPATIENT
Start: 2024-12-15

## 2024-12-15 RX ORDER — NITROFURANTOIN 25; 75 MG/1; MG/1
100 CAPSULE ORAL 2 TIMES DAILY
Qty: 10 CAPSULE | Refills: 0 | Status: SHIPPED | OUTPATIENT
Start: 2024-12-15 | End: 2024-12-20

## 2024-12-15 RX ORDER — ACYCLOVIR 400 MG/1
400 TABLET ORAL 3 TIMES DAILY
Qty: 30 TABLET | Refills: 0 | Status: SHIPPED | OUTPATIENT
Start: 2024-12-15 | End: 2024-12-25

## 2024-12-15 ASSESSMENT — PAIN SCALES - GENERAL: PAINLEVEL_OUTOF10: 0 - NO PAIN

## 2024-12-15 ASSESSMENT — PAIN - FUNCTIONAL ASSESSMENT: PAIN_FUNCTIONAL_ASSESSMENT: 0-10

## 2024-12-15 ASSESSMENT — COLUMBIA-SUICIDE SEVERITY RATING SCALE - C-SSRS
2. HAVE YOU ACTUALLY HAD ANY THOUGHTS OF KILLING YOURSELF?: NO
6. HAVE YOU EVER DONE ANYTHING, STARTED TO DO ANYTHING, OR PREPARED TO DO ANYTHING TO END YOUR LIFE?: NO
1. IN THE PAST MONTH, HAVE YOU WISHED YOU WERE DEAD OR WISHED YOU COULD GO TO SLEEP AND NOT WAKE UP?: NO

## 2024-12-15 NOTE — DISCHARGE INSTRUCTIONS
Take acyclovir 3 times a day for 10 days.  Will call with results of general swab.  Can also use lidocaine cream on external genitalia for pain.     Take Macrobid for UTI.  Take Flagyl for BV.  Important not to drink alcohol while you are on Flagyl    Do not engage in sexual activity until all lesions are resolved.   Return to ED if symptoms worsen or new symptoms arise.   Follow up with PCP for further evaluation if needed.

## 2024-12-15 NOTE — ED PROVIDER NOTES
"    History of Present Illness       History provided by: Patient  Limitations to History: None  External Records Reviewed with Brief Summary: None    HPI:  HPI   This is a 24-year-old with vaginal itching, odor, discharge x 1 day.  She admits to increased frequency, increased urgency, and believes there is some sort of bump or \"boil\" by her vaginal opening.  She denies use of new products, including laundry detergents, soaps.  She denies vaginal bleeding, and admits to being 2 months postpartum and has not menstruated in December.  She reports not being sexually active for 2 months, however she still would like a full workup with STD testing.  She denies chest pain, shortness of breath, abdominal pain, pelvic pain, numbness, tingling, or headaches.      Physical Exam   Physical Exam     Triage vitals:  T 36.8 °C (98.2 °F)  HR 76  /78  RR 16  O2 100 % None (Room air)      Physical exam:   General: Vitals noted, no distress. Afebrile.   EENT:  Hearing grossly intact. Normal phonation. MMM. Airway patent. PERRL. EOMI.   Neck: No midline tenderness or paraspinal tenderness. FROM.   Cardiac: Regular, rate, rhythm. Normal S1 and S2.  No murmurs, gallops, rubs.   Pulmonary: Good air exchange. Lungs clear bilaterally. No wheezes, rhonchi, rales. No accessory muscle use.   Abdomen: Soft, nonsurgical. Nontender. No peritoneal signs.   Back: No CVA tenderness. No midline tenderness or paraspinal tenderness. No obvious deformity.   Extremities: No peripheral edema.  Full range of motion. Moves all extremities freely. No tenderness throughout extremities.   Skin: No rash. Warm and Dry. Clustered vesicles noted on right labia majora.   Neuro: No focal neurologic deficits. CN 2-12 grossly intact. Sensation equal bilaterally. No weakness.   Pelvic: Pelvic exam performed at bedside with nurse.  Clustered vesicles on right labia majora noted. Cervical os closed, no CMT or adnexal tenderness, no vaginal bleeding or " "discharge.         Medical Decision Making & ED Course     ED Course & MDM       Medical Decision Making:  Medical Decision Making  This is 24-year-old female presenting with vaginal itching, discomfort, odor, discharge for 1 day.  She is 2 months postpartum and reports she has noted had sexual activity in over 2 months.  She admits to increased urinary frequency and urgency, denies pelvic pain, bleeding, abdominal pain, back pain.  A pelvic exam was performed at the bedside and clustered vesicles were noted on the right labia majora, CNP came into the room to confirm diagnosis with me.  Viral swab of the lesion was obtained as well as STD swabs.  The rest of the pelvic exam was unremarkable.  Patient was informed about the length of testing for possible HSV, and to avoid all sexual activity until outbreak has resolved. Patient reports this is her first outbreak of a lesion like this. She was given acyclovir and lidocaine cream.   UA shows positive leukocytes, WBC on micro. Sending macrobid BID   Clue cells indicating BV, sending metronidazole.     Patient discussed and staffed with Coral Rothman CNP   ----    Visit Vitals  /78 (BP Location: Left arm, Patient Position: Sitting)   Pulse 76   Temp 36.8 °C (98.2 °F)   Resp 16   Ht 1.6 m (5' 3\")   Wt 104 kg (230 lb)   LMP 12/06/2023   SpO2 100%   BMI 40.74 kg/m²   OB Status Recent pregnancy   Smoking Status Former   BSA 2.15 m²        Labs Reviewed   TRICHOMONAS WET PREP REFLEX TO PCR   C. TRACHOMATIS + N. GONORRHOEAE, AMPLIFIED   URINALYSIS WITH REFLEX CULTURE AND MICROSCOPIC    Narrative:     The following orders were created for panel order Urinalysis with Reflex Culture and Microscopic.  Procedure                               Abnormality         Status                     ---------                               -----------         ------                     Urinalysis with Reflex C...[[id number]]                                                 Extra Urine Martinez " Tube[[id number]]                                                         Please view results for these tests on the individual orders.   HEPATITIS C ANTIBODY   HIV 1/2 ANTIGEN/ANTIBODY SCREEN WIH REFLEX TO CONFIRMATION   SYPHILIS SCREENING WITH REFLEX   URINALYSIS WITH REFLEX CULTURE AND MICROSCOPIC   EXTRA URINE GRAY TUBE   POCT PREGNANCY, URINE       No orders to display       ED Course:  Diagnoses as of 12/15/24 1814   Vesicular lesion   Urinary tract infection without hematuria, site unspecified   Bacterial vaginosis     Disposition   Dispo home with acyclovir, macrobid, and metro. Follow up with PCP for further eval.     Procedures   Procedures        Amelia Villatoro PA-C  Emergency Medicine      Amelia Villatoro PA-C  12/15/24 1816

## 2024-12-16 ENCOUNTER — TELEPHONE (OUTPATIENT)
Dept: PHARMACY | Facility: HOSPITAL | Age: 24
End: 2024-12-16
Payer: COMMERCIAL

## 2024-12-16 LAB
BACTERIA UR CULT: NO GROWTH
C TRACH RRNA SPEC QL NAA+PROBE: NEGATIVE
HOLD SPECIMEN: NORMAL
HSV1 DNA SKIN QL NAA+PROBE: NOT DETECTED
HSV2 DNA SKIN QL NAA+PROBE: DETECTED
N GONORRHOEA DNA SPEC QL PROBE+SIG AMP: NEGATIVE
T VAGINALIS RRNA SPEC QL NAA+PROBE: NEGATIVE
TREPONEMA PALLIDUM IGG+IGM AB [PRESENCE] IN SERUM OR PLASMA BY IMMUNOASSAY: NONREACTIVE

## 2024-12-16 NOTE — PROGRESS NOTES
EDPD Note: Negative Results    The EDPD Post-Discharge Team was called regarding Della Oconnell  urine culture/result that was taken during their recent emergency room visit. I gave patient their results. The culture/result were negative and there were no other questions at this time.  Due to no UTI s/s reported today and no growth seen in urine culture. Advised patient to not start macrobid.     If there are any other questions for the ED Post-Discharge Culture Follow Up Team, please contact [phone]. Fax: [phone].    Don Randolph RPh

## 2024-12-16 NOTE — PROGRESS NOTES
EDPD Note: Duration Adjust    Contacted Della Oconnell regarding positive Clue Cells culture/result that was taken during their recent emergency room visit. I completed education with patient. The patient is being treated with the proper medication; however, the duration of the discharge prescription is incorrect . I gave verbal education about the new medication duration found below. No further follow up needed from EDPD Team.     Patient was discharged with Metronidazole 500 mg BID x 10 days. Advised to decrease total duration to 7 days.    No results found for the last 90 days.      If there are any other questions for the ED Post-Discharge Culture Follow Up Team, please contact [phone]. Fax: [phone].    Don Randolph RPh

## 2024-12-16 NOTE — PROGRESS NOTES
EDPD Note: Duration Adjust    Contacted Della Oconnell regarding positive HSV-2 culture/result that was taken during their recent emergency room visit. I completed education with patient. The patient is being treated with the proper medication; however, the duration of the discharge prescription is incorrect . I gave verbal education about the new medication duration found below. No further follow up needed from EDPD Team.     Patient was discharged with Acyclovir 400 mg TID x 10 days. Advised to decrease total duration to 7 days.     No results found for the last 90 days.      If there are any other questions for the ED Post-Discharge Culture Follow Up Team, please contact [phone]. Fax: [phone].    Don Randolph RPh

## 2024-12-30 DIAGNOSIS — Z34.80 SUPERVISION OF OTHER NORMAL PREGNANCY, ANTEPARTUM (HHS-HCC): ICD-10-CM

## 2024-12-30 RX ORDER — DOCUSATE SODIUM 100 MG/1
100 CAPSULE, LIQUID FILLED ORAL NIGHTLY PRN
Qty: 90 CAPSULE | Refills: 1 | Status: SHIPPED | OUTPATIENT
Start: 2024-12-30

## 2025-03-18 ENCOUNTER — APPOINTMENT (OUTPATIENT)
Dept: RADIOLOGY | Facility: HOSPITAL | Age: 25
End: 2025-03-18
Payer: COMMERCIAL

## 2025-03-18 ENCOUNTER — HOSPITAL ENCOUNTER (EMERGENCY)
Facility: HOSPITAL | Age: 25
Discharge: HOME | End: 2025-03-18
Attending: EMERGENCY MEDICINE
Payer: COMMERCIAL

## 2025-03-18 VITALS
TEMPERATURE: 97.9 F | WEIGHT: 230 LBS | DIASTOLIC BLOOD PRESSURE: 51 MMHG | HEIGHT: 63 IN | HEART RATE: 82 BPM | OXYGEN SATURATION: 100 % | BODY MASS INDEX: 40.75 KG/M2 | SYSTOLIC BLOOD PRESSURE: 111 MMHG | RESPIRATION RATE: 17 BRPM

## 2025-03-18 DIAGNOSIS — N30.90 CYSTITIS: ICD-10-CM

## 2025-03-18 DIAGNOSIS — B96.89 BACTERIAL VAGINOSIS: ICD-10-CM

## 2025-03-18 DIAGNOSIS — N76.0 BACTERIAL VAGINOSIS: ICD-10-CM

## 2025-03-18 DIAGNOSIS — Z34.90 PREGNANCY, UNSPECIFIED GESTATIONAL AGE (HHS-HCC): Primary | ICD-10-CM

## 2025-03-18 LAB
ABO GROUP (TYPE) IN BLOOD: NORMAL
ALBUMIN SERPL BCP-MCNC: 4 G/DL (ref 3.4–5)
ALP SERPL-CCNC: 61 U/L (ref 33–110)
ALT SERPL W P-5'-P-CCNC: 19 U/L (ref 7–45)
ANION GAP SERPL CALC-SCNC: 12 MMOL/L (ref 10–20)
ANTIBODY SCREEN: NORMAL
APPEARANCE UR: CLEAR
AST SERPL W P-5'-P-CCNC: 15 U/L (ref 9–39)
B-HCG SERPL-ACNC: 3464 MIU/ML
BASOPHILS # BLD AUTO: 0.03 X10*3/UL (ref 0–0.1)
BASOPHILS NFR BLD AUTO: 0.3 %
BILIRUB SERPL-MCNC: 0.4 MG/DL (ref 0–1.2)
BILIRUB UR STRIP.AUTO-MCNC: NEGATIVE MG/DL
BUN SERPL-MCNC: 9 MG/DL (ref 6–23)
CALCIUM SERPL-MCNC: 9.1 MG/DL (ref 8.6–10.6)
CHLORIDE SERPL-SCNC: 105 MMOL/L (ref 98–107)
CLUE CELLS SPEC QL WET PREP: PRESENT
CO2 SERPL-SCNC: 22 MMOL/L (ref 21–32)
COLOR UR: COLORLESS
CREAT SERPL-MCNC: 0.63 MG/DL (ref 0.5–1.05)
EGFRCR SERPLBLD CKD-EPI 2021: >90 ML/MIN/1.73M*2
EOSINOPHIL # BLD AUTO: 0.23 X10*3/UL (ref 0–0.7)
EOSINOPHIL NFR BLD AUTO: 2.4 %
ERYTHROCYTE [DISTWIDTH] IN BLOOD BY AUTOMATED COUNT: 14.6 % (ref 11.5–14.5)
GLUCOSE SERPL-MCNC: 107 MG/DL (ref 74–99)
GLUCOSE UR STRIP.AUTO-MCNC: NORMAL MG/DL
HCT VFR BLD AUTO: 35.4 % (ref 36–46)
HGB BLD-MCNC: 12 G/DL (ref 12–16)
HIV 1+2 AB+HIV1 P24 AG SERPL QL IA: NONREACTIVE
HOLD SPECIMEN: NORMAL
IMM GRANULOCYTES # BLD AUTO: 0.03 X10*3/UL (ref 0–0.7)
IMM GRANULOCYTES NFR BLD AUTO: 0.3 % (ref 0–0.9)
KETONES UR STRIP.AUTO-MCNC: NEGATIVE MG/DL
LEUKOCYTE ESTERASE UR QL STRIP.AUTO: ABNORMAL
LYMPHOCYTES # BLD AUTO: 4.13 X10*3/UL (ref 1.2–4.8)
LYMPHOCYTES NFR BLD AUTO: 43.2 %
MCH RBC QN AUTO: 26.5 PG (ref 26–34)
MCHC RBC AUTO-ENTMCNC: 33.9 G/DL (ref 32–36)
MCV RBC AUTO: 78 FL (ref 80–100)
MONOCYTES # BLD AUTO: 0.58 X10*3/UL (ref 0.1–1)
MONOCYTES NFR BLD AUTO: 6.1 %
NEUTROPHILS # BLD AUTO: 4.57 X10*3/UL (ref 1.2–7.7)
NEUTROPHILS NFR BLD AUTO: 47.7 %
NITRITE UR QL STRIP.AUTO: NEGATIVE
NRBC BLD-RTO: 0 /100 WBCS (ref 0–0)
PH UR STRIP.AUTO: 6.5 [PH]
PLATELET # BLD AUTO: 299 X10*3/UL (ref 150–450)
POTASSIUM SERPL-SCNC: 3.8 MMOL/L (ref 3.5–5.3)
PREGNANCY TEST URINE, POC: POSITIVE
PROT SERPL-MCNC: 6.5 G/DL (ref 6.4–8.2)
PROT UR STRIP.AUTO-MCNC: NEGATIVE MG/DL
RBC # BLD AUTO: 4.52 X10*6/UL (ref 4–5.2)
RBC # UR STRIP.AUTO: NEGATIVE MG/DL
RBC #/AREA URNS AUTO: ABNORMAL /HPF
RH FACTOR (ANTIGEN D): NORMAL
SODIUM SERPL-SCNC: 135 MMOL/L (ref 136–145)
SP GR UR STRIP.AUTO: 1.01
SQUAMOUS #/AREA URNS AUTO: ABNORMAL /HPF
T VAGINALIS SPEC QL WET PREP: ABNORMAL
TREPONEMA PALLIDUM IGG+IGM AB [PRESENCE] IN SERUM OR PLASMA BY IMMUNOASSAY: NONREACTIVE
TRICHOMONAS REFLEX COMMENT: ABNORMAL
UROBILINOGEN UR STRIP.AUTO-MCNC: NORMAL MG/DL
WBC # BLD AUTO: 9.6 X10*3/UL (ref 4.4–11.3)
WBC #/AREA URNS AUTO: ABNORMAL /HPF
WBC VAG QL WET PREP: ABNORMAL
YEAST VAG QL WET PREP: ABNORMAL

## 2025-03-18 PROCEDURE — 86900 BLOOD TYPING SEROLOGIC ABO: CPT

## 2025-03-18 PROCEDURE — 87389 HIV-1 AG W/HIV-1&-2 AB AG IA: CPT

## 2025-03-18 PROCEDURE — 85025 COMPLETE CBC W/AUTO DIFF WBC: CPT

## 2025-03-18 PROCEDURE — 87086 URINE CULTURE/COLONY COUNT: CPT

## 2025-03-18 PROCEDURE — 81003 URINALYSIS AUTO W/O SCOPE: CPT

## 2025-03-18 PROCEDURE — 80053 COMPREHEN METABOLIC PANEL: CPT

## 2025-03-18 PROCEDURE — 86780 TREPONEMA PALLIDUM: CPT

## 2025-03-18 PROCEDURE — 87491 CHLMYD TRACH DNA AMP PROBE: CPT

## 2025-03-18 PROCEDURE — 81025 URINE PREGNANCY TEST: CPT

## 2025-03-18 PROCEDURE — 76817 TRANSVAGINAL US OBSTETRIC: CPT | Performed by: STUDENT IN AN ORGANIZED HEALTH CARE EDUCATION/TRAINING PROGRAM

## 2025-03-18 PROCEDURE — 99284 EMERGENCY DEPT VISIT MOD MDM: CPT | Mod: 25 | Performed by: EMERGENCY MEDICINE

## 2025-03-18 PROCEDURE — 84702 CHORIONIC GONADOTROPIN TEST: CPT

## 2025-03-18 PROCEDURE — 2500000001 HC RX 250 WO HCPCS SELF ADMINISTERED DRUGS (ALT 637 FOR MEDICARE OP): Mod: SE

## 2025-03-18 PROCEDURE — 87210 SMEAR WET MOUNT SALINE/INK: CPT

## 2025-03-18 PROCEDURE — 76830 TRANSVAGINAL US NON-OB: CPT

## 2025-03-18 PROCEDURE — 99284 EMERGENCY DEPT VISIT MOD MDM: CPT | Performed by: EMERGENCY MEDICINE

## 2025-03-18 PROCEDURE — 36415 COLL VENOUS BLD VENIPUNCTURE: CPT

## 2025-03-18 PROCEDURE — 87661 TRICHOMONAS VAGINALIS AMPLIF: CPT

## 2025-03-18 PROCEDURE — 76801 OB US < 14 WKS SINGLE FETUS: CPT | Performed by: STUDENT IN AN ORGANIZED HEALTH CARE EDUCATION/TRAINING PROGRAM

## 2025-03-18 RX ORDER — METRONIDAZOLE 500 MG/1
500 TABLET ORAL ONCE
Status: COMPLETED | OUTPATIENT
Start: 2025-03-18 | End: 2025-03-18

## 2025-03-18 RX ORDER — CEPHALEXIN 500 MG/1
500 CAPSULE ORAL 4 TIMES DAILY
Qty: 40 CAPSULE | Refills: 0 | Status: SHIPPED | OUTPATIENT
Start: 2025-03-18 | End: 2025-03-21 | Stop reason: ALTCHOICE

## 2025-03-18 RX ORDER — METRONIDAZOLE 500 MG/1
500 TABLET ORAL 3 TIMES DAILY
Qty: 30 TABLET | Refills: 0 | Status: SHIPPED | OUTPATIENT
Start: 2025-03-18 | End: 2025-03-28

## 2025-03-18 RX ADMIN — METRONIDAZOLE 500 MG: 500 TABLET, FILM COATED ORAL at 07:15

## 2025-03-18 ASSESSMENT — PAIN SCALES - GENERAL: PAINLEVEL_OUTOF10: 7

## 2025-03-18 ASSESSMENT — PAIN - FUNCTIONAL ASSESSMENT: PAIN_FUNCTIONAL_ASSESSMENT: 0-10

## 2025-03-18 ASSESSMENT — PAIN DESCRIPTION - LOCATION: LOCATION: ABDOMEN

## 2025-03-18 NOTE — ED TRIAGE NOTES
Patient presents to the ED for menstrual cramps. Patient states she is not supposed to be on her menstrual cycle right now. Patient last menstrual was middle of February. Denies any vaginal bleeding.

## 2025-03-18 NOTE — ED PROVIDER NOTES
HPI   Chief Complaint   Patient presents with    Abdominal Pain       HPI  Patient is a 24-year-old no past medical history presented with abdominal crampiness.  Patient said for the past 4 weeks she has been having this abdominal crampiness.  The past 2 days it got worse.  Patient denies any vaginal bleeding but has been having mild discharge.  Patient has also been having urinary frequency and dysuria.  Patient denies any history of diabetes.  Patient has not had any trauma to that region.  Patient just had a baby 6 months ago but is not on birth control and is sexually active.      Patient History   Past Medical History:   Diagnosis Date    BMI 40.0-44.9, adult (Multi)     Chlamydia     History of pre-eclampsia     Hx of gonorrhea      No past surgical history on file.  Family History   Problem Relation Name Age of Onset    Multiple sclerosis Mother      Other (COVID) Father      Diabetes Maternal Grandmother       Social History     Tobacco Use    Smoking status: Former     Types: Cigars    Smokeless tobacco: Never   Vaping Use    Vaping status: Former   Substance Use Topics    Alcohol use: Not Currently    Drug use: Not Currently     Types: Marijuana       Physical Exam   ED Triage Vitals [03/18/25 0317]   Temperature Heart Rate Respirations BP   36.6 °C (97.9 °F) 82 17 111/51      Pulse Ox Temp Source Heart Rate Source Patient Position   100 % Temporal Monitor --      BP Location FiO2 (%)     -- --       Physical Exam  Constitutional:       Appearance: She is well-developed.   HENT:      Head: Normocephalic and atraumatic.   Cardiovascular:      Rate and Rhythm: Normal rate and regular rhythm.   Abdominal:      General: Abdomen is flat. Bowel sounds are normal. There is no distension or abdominal bruit.      Tenderness: There is abdominal tenderness in the suprapubic area. There is no right CVA tenderness or left CVA tenderness.   Genitourinary:     Vagina: Normal.      Cervix: Normal.      Uterus: Normal. Not  deviated, not enlarged, not fixed and not tender.       Adnexa: Right adnexa normal and left adnexa normal.   Neurological:      General: No focal deficit present.      Mental Status: She is alert and oriented to person, place, and time.           ED Course & MDM   Diagnoses as of 03/18/25 0757   Pregnancy, unspecified gestational age ([facility])   Bacterial vaginosis   Cystitis                 No data recorded     Justin Coma Scale Score: 15 (03/18/25 0317 : Sneha Cassidy RN)                           Medical Decision Making  Patient is a 24-year-old no past medical history presented with abdominal crampiness.  The differential diagnoses considered for this patient were pregnancy, STIs, and urinary tract infection.  At bedside patient was hemodynamically stable.  Patient has suprapubic tenderness on physical exam.  Patient pelvic exam showed white milky discharge.  Patient tested positive for pregnancy.  Patient CBC and CMP were unremarkable.  Patient beta quant was around 3464.  Patient was negative for trichomonas but positive for clue cells.  Patient was started on metronidazole.  Patient urinalysis was negative for urinary tract infection.  Patient was prescribed Flagyl and Keflex outpatient.  Patient could not wait for her ultrasound results so patient will look at the results through her MyChart.  Patient was descending stable condition.  Procedure  Procedures     Eduin Osei MD  Resident  03/18/25 0721       Eduin Osei MD  Resident  03/18/25 0753       Eduin Osei MD  Resident  03/18/25 0758

## 2025-03-19 LAB
BACTERIA UR CULT: NORMAL
C TRACH RRNA SPEC QL NAA+PROBE: POSITIVE
N GONORRHOEA DNA SPEC QL PROBE+SIG AMP: NEGATIVE
T VAGINALIS RRNA SPEC QL NAA+PROBE: NEGATIVE

## 2025-03-21 ENCOUNTER — TELEPHONE (OUTPATIENT)
Dept: PHARMACY | Facility: HOSPITAL | Age: 25
End: 2025-03-21
Payer: COMMERCIAL

## 2025-03-21 DIAGNOSIS — B96.89 BACTERIAL VULVOVAGINITIS: Primary | ICD-10-CM

## 2025-03-21 DIAGNOSIS — N76.0 BACTERIAL VULVOVAGINITIS: Primary | ICD-10-CM

## 2025-03-21 RX ORDER — AZITHROMYCIN 500 MG/1
1000 TABLET, FILM COATED ORAL ONCE
Qty: 2 TABLET | Refills: 0 | Status: SHIPPED | OUTPATIENT
Start: 2025-03-21 | End: 2025-03-21

## 2025-03-21 NOTE — PROGRESS NOTES
EDPD Note: Negative Results    The EDPD Post-Discharge Team was called regarding Della Oconnell  urine culture/result that was taken during their recent emergency room visit. I gave patient their results. The culture/result were negative and there were no other questions at this time.  Patient may discontinue Keflex at this time.   If there are any other questions for the ED Post-Discharge Culture Follow Up Team, please contact [phone]. Fax: [phone].  Urine Culture  Order: [id number] - Reflex for Order [id number]   Collected 3/18/2025 05:02       Status: Final result       Visible to patient: Yes (not seen)    Specimen Information: Clean Catch/Voided; Urine   0 Result Notes  Urine Culture Clinically insignificant growth based on current clinical standards.        Resulting Agency: [facility]          Specimen Collected: 03/18/25 05:02 Last Resulted: 03/19/25 07:56       Order Details        View Encounter        Lab and Collection Details        Routing        Result History     View All Conversations on this Encounter     Result Care Coordination      Patient Communication     Add Comments   Add Notifications  Back to Top     Noam Ruiz, DeborahD

## 2025-03-21 NOTE — PROGRESS NOTES
EDPD Note: Initiation     Contacted Della Oconnell regarding a positive chlamydia culture/result that was taken during their recent emergency room visit. I completed education with patient. The patient is not being treated appropriately.    Patient presented to ED for abdominal pain and urinary frequency and dysuria. Deneid any other systemic sxs. She is pregnant. Tested positive for chlamydia and will send in for one time dose of azithromycin. EPT was offered but was unable to accurately get information regarding partner. Partner will be reaching out to PCP.     The following prescription was sent to the patient's preferred pharmacy. No further follow up needed from EDPD Team.     Drug: Azithromycin 1 g  Sig: Take one tablet by mouth once for one time dose  Qty: 1  Days Supply: 1    No results found for the last 90 days.  C. trachomatis / N. gonorrhoeae, Amplified, Urogenital: [id number]  Order: [id number]   Collected 3/18/2025 05:33       Status: Final result       Visible to patient: Yes (not seen)    1 Result Note       1 Follow-up Encounter      Component  Ref Range & Units    Neisseria gonorrhea,Amplified  Negative Negative   Chlamydia trachomatis, Amplified  Negative Positive Abnormal    Resulting Agency [facility]              Narrative  Performed by: [facility]  The APTIMA Combo 2 assay is FDA-approved NAAT using target capture for the in vitro qualitative detection and differentiation of ribosomal RNA (rRNA) for Chlamydia trachomatis and Neisseria gonorrhoeae testing on clinician-collected endocervical, PreservCyt solution liquid Pap specimens, vaginal, throat, rectal, and male urethral swab specimens; patient-collected vaginal swab specimens, and female and male urine specimens from symptomatic and asymptomatic individuals. Samples from all other sites are not validated for this method.   Specimen Collected: 03/18/25 05:33 Last Resulted: 03/19/25 10:51       If there are any other questions for the ED  Post-Discharge Culture Follow Up Team, please contact [phone]. Fax: [phone].    Deborah MeléndezD

## 2025-03-21 NOTE — PROGRESS NOTES
EDPD Note: Dose Change    Contacted Della Oconnell regarding positive BV culture/result that was taken during their recent emergency room visit. I completed education with spouse. The patient is being treated with the proper medication; however, the dose of the discharge prescription is incorrect . I gave verbal education about the new medication dose found below. No further follow up needed from EDPD Team.     Patient tested positive for BV and was discharged with Flagyl 500 mg tid x 10 days. Patient will take it bid for 7 days and duration will be noted in separate I-vent.    No results found for the last 90 days.  Trichomonas Wet Prep Reflex to PCR  Order: [id number]   Status: Final result       Visible to patient: Yes (seen)    0 Result Notes            Component  Ref Range & Units 3 d ago  (3/18/25) 3 mo ago  (12/15/24) 1 yr ago  (2/4/24) 1 yr ago  (9/11/23) 1 yr ago  (4/18/23) 1 yr ago  (4/17/23) 2 yr ago  (6/8/22)   Trichomonas  None Seen None Seen None Seen None Seen NONE SEEN R NONE SEEN R CANCELED R, CM NONE SEEN R   Clue Cells  None Seen Present Abnormal  Present Abnormal  Present Abnormal  PRESENT Abnormal  R PRESENT Abnormal  R CANCELED R, CM PRESENT Abnormal  R   Yeast  None Seen None Seen None Seen None Seen NONE SEEN R PRESENT Abnormal  R CANCELED R, CM NONE SEEN R   WBC 3-8 3-8 9-20 9-20 R 1-2 R CANCELED CM 21-50 R   Trichomonas reflex comment Trichomonas was not seen by wet prep. Reflex Trichomonas vaginalis by Amplified Detection. Trichomonas was not seen by wet prep. Reflex Trichomonas vaginalis by Amplified Detection. Trichomonas was not seen by wet prep. Reflex Trichomonas vaginalis by Amplified Detection. SEE COMMENT CM   SEE COMMENT CM   [facility]          If there are any other questions for the ED Post-Discharge Culture Follow Up Team, please contact [phone]. Fax: [phone].    Noam Ruiz, DeborahD

## 2025-03-30 ENCOUNTER — HOSPITAL ENCOUNTER (EMERGENCY)
Facility: HOSPITAL | Age: 25
Discharge: HOME | End: 2025-03-30
Attending: EMERGENCY MEDICINE
Payer: MEDICARE

## 2025-03-30 VITALS
TEMPERATURE: 97.7 F | SYSTOLIC BLOOD PRESSURE: 106 MMHG | OXYGEN SATURATION: 96 % | RESPIRATION RATE: 16 BRPM | HEIGHT: 63 IN | WEIGHT: 230 LBS | DIASTOLIC BLOOD PRESSURE: 64 MMHG | HEART RATE: 73 BPM | BODY MASS INDEX: 40.75 KG/M2

## 2025-03-30 DIAGNOSIS — Z04.1 EXAM FOLLOWING MVC (MOTOR VEHICLE COLLISION), NO APPARENT INJURY: Primary | ICD-10-CM

## 2025-03-30 PROCEDURE — 99283 EMERGENCY DEPT VISIT LOW MDM: CPT | Performed by: EMERGENCY MEDICINE

## 2025-03-30 PROCEDURE — 2500000005 HC RX 250 GENERAL PHARMACY W/O HCPCS

## 2025-03-30 PROCEDURE — 2500000001 HC RX 250 WO HCPCS SELF ADMINISTERED DRUGS (ALT 637 FOR MEDICARE OP)

## 2025-03-30 PROCEDURE — 99284 EMERGENCY DEPT VISIT MOD MDM: CPT | Performed by: EMERGENCY MEDICINE

## 2025-03-30 RX ORDER — LIDOCAINE 560 MG/1
2 PATCH PERCUTANEOUS; TOPICAL; TRANSDERMAL DAILY
Status: DISCONTINUED | OUTPATIENT
Start: 2025-03-30 | End: 2025-03-30 | Stop reason: HOSPADM

## 2025-03-30 RX ORDER — ACETAMINOPHEN 325 MG/1
650 TABLET ORAL ONCE
Status: COMPLETED | OUTPATIENT
Start: 2025-03-30 | End: 2025-03-30

## 2025-03-30 RX ORDER — LIDOCAINE 50 MG/G
1 PATCH TOPICAL DAILY
Qty: 10 PATCH | Refills: 0 | Status: SHIPPED | OUTPATIENT
Start: 2025-03-30

## 2025-03-30 RX ADMIN — ACETAMINOPHEN 650 MG: 325 TABLET ORAL at 15:46

## 2025-03-30 RX ADMIN — LIDOCAINE 2 PATCH: 4 PATCH TOPICAL at 15:48

## 2025-03-30 ASSESSMENT — LIFESTYLE VARIABLES
EVER HAD A DRINK FIRST THING IN THE MORNING TO STEADY YOUR NERVES TO GET RID OF A HANGOVER: NO
HAVE YOU EVER FELT YOU SHOULD CUT DOWN ON YOUR DRINKING: NO
EVER FELT BAD OR GUILTY ABOUT YOUR DRINKING: NO
TOTAL SCORE: 0
HAVE PEOPLE ANNOYED YOU BY CRITICIZING YOUR DRINKING: NO

## 2025-03-30 ASSESSMENT — PAIN DESCRIPTION - LOCATION: LOCATION: HEAD

## 2025-03-30 ASSESSMENT — PAIN SCALES - GENERAL: PAINLEVEL_OUTOF10: 8

## 2025-03-30 ASSESSMENT — PAIN - FUNCTIONAL ASSESSMENT: PAIN_FUNCTIONAL_ASSESSMENT: 0-10

## 2025-03-30 ASSESSMENT — COLUMBIA-SUICIDE SEVERITY RATING SCALE - C-SSRS
6. HAVE YOU EVER DONE ANYTHING, STARTED TO DO ANYTHING, OR PREPARED TO DO ANYTHING TO END YOUR LIFE?: NO
2. HAVE YOU ACTUALLY HAD ANY THOUGHTS OF KILLING YOURSELF?: NO
1. IN THE PAST MONTH, HAVE YOU WISHED YOU WERE DEAD OR WISHED YOU COULD GO TO SLEEP AND NOT WAKE UP?: NO

## 2025-03-30 NOTE — DISCHARGE INSTRUCTIONS
You were seen today in the emergency department, you were given Tylenol and lidocaine patches.  Please follow-up with an OB regarding your pregnancy.    Please return to the ED or see your doctor if any worsening or new symptoms.

## 2025-03-30 NOTE — Clinical Note
Della Oconnell was seen and treated in our emergency department on 3/30/2025.  She may return to work on 04/02/2025.       If you have any questions or concerns, please don't hesitate to call.      Luz Lino MD
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
## 2025-04-20 ENCOUNTER — APPOINTMENT (OUTPATIENT)
Dept: RADIOLOGY | Facility: HOSPITAL | Age: 25
End: 2025-04-20
Payer: COMMERCIAL

## 2025-04-20 ENCOUNTER — HOSPITAL ENCOUNTER (EMERGENCY)
Facility: HOSPITAL | Age: 25
Discharge: HOME | End: 2025-04-20
Attending: EMERGENCY MEDICINE
Payer: COMMERCIAL

## 2025-04-20 VITALS
OXYGEN SATURATION: 95 % | TEMPERATURE: 97.7 F | BODY MASS INDEX: 42.52 KG/M2 | HEART RATE: 75 BPM | SYSTOLIC BLOOD PRESSURE: 139 MMHG | RESPIRATION RATE: 20 BRPM | HEIGHT: 63 IN | DIASTOLIC BLOOD PRESSURE: 79 MMHG | WEIGHT: 240 LBS

## 2025-04-20 DIAGNOSIS — J06.9 VIRAL URI: Primary | ICD-10-CM

## 2025-04-20 LAB
FLUAV RNA RESP QL NAA+PROBE: NOT DETECTED
FLUBV RNA RESP QL NAA+PROBE: NOT DETECTED
RSV RNA RESP QL NAA+PROBE: NOT DETECTED
SARS-COV-2 RNA RESP QL NAA+PROBE: NOT DETECTED

## 2025-04-20 PROCEDURE — 71045 X-RAY EXAM CHEST 1 VIEW: CPT | Mod: FOREIGN READ | Performed by: RADIOLOGY

## 2025-04-20 PROCEDURE — 99284 EMERGENCY DEPT VISIT MOD MDM: CPT | Performed by: EMERGENCY MEDICINE

## 2025-04-20 PROCEDURE — 71045 X-RAY EXAM CHEST 1 VIEW: CPT

## 2025-04-20 PROCEDURE — 2500000001 HC RX 250 WO HCPCS SELF ADMINISTERED DRUGS (ALT 637 FOR MEDICARE OP): Mod: SE | Performed by: EMERGENCY MEDICINE

## 2025-04-20 PROCEDURE — 87637 SARSCOV2&INF A&B&RSV AMP PRB: CPT | Performed by: EMERGENCY MEDICINE

## 2025-04-20 RX ORDER — GUAIFENESIN/DEXTROMETHORPHAN 100-10MG/5
5 SYRUP ORAL ONCE
Status: COMPLETED | OUTPATIENT
Start: 2025-04-20 | End: 2025-04-20

## 2025-04-20 RX ORDER — ACETAMINOPHEN 325 MG/1
650 TABLET ORAL ONCE
Status: COMPLETED | OUTPATIENT
Start: 2025-04-20 | End: 2025-04-20

## 2025-04-20 RX ORDER — GUAIFENESIN/DEXTROMETHORPHAN 100-10MG/5
5 SYRUP ORAL 3 TIMES DAILY PRN
Qty: 118 ML | Refills: 0 | Status: SHIPPED | OUTPATIENT
Start: 2025-04-20

## 2025-04-20 RX ADMIN — GUAIFENESIN AND DEXTROMETHORPHAN 5 ML: 100; 10 SYRUP ORAL at 10:35

## 2025-04-20 RX ADMIN — ACETAMINOPHEN 650 MG: 325 TABLET ORAL at 10:35

## 2025-04-20 ASSESSMENT — PAIN DESCRIPTION - LOCATION: LOCATION: CHEST

## 2025-04-20 ASSESSMENT — PAIN SCALES - GENERAL: PAINLEVEL_OUTOF10: 7

## 2025-04-20 ASSESSMENT — COLUMBIA-SUICIDE SEVERITY RATING SCALE - C-SSRS
2. HAVE YOU ACTUALLY HAD ANY THOUGHTS OF KILLING YOURSELF?: NO
1. IN THE PAST MONTH, HAVE YOU WISHED YOU WERE DEAD OR WISHED YOU COULD GO TO SLEEP AND NOT WAKE UP?: NO
6. HAVE YOU EVER DONE ANYTHING, STARTED TO DO ANYTHING, OR PREPARED TO DO ANYTHING TO END YOUR LIFE?: NO

## 2025-04-20 ASSESSMENT — PAIN DESCRIPTION - PAIN TYPE: TYPE: ACUTE PAIN

## 2025-04-20 ASSESSMENT — PAIN DESCRIPTION - DESCRIPTORS: DESCRIPTORS: ACHING

## 2025-04-20 ASSESSMENT — PAIN DESCRIPTION - ORIENTATION: ORIENTATION: ANTERIOR

## 2025-04-20 ASSESSMENT — PAIN - FUNCTIONAL ASSESSMENT: PAIN_FUNCTIONAL_ASSESSMENT: 0-10

## 2025-04-20 NOTE — ED TRIAGE NOTES
Pt presents to the ED for complaints of flu like symptoms that started 2 days ago. Pt states she is having congestion, chest pressure, cough and is having SOB. Pt states that her symptoms have progressively gotten worse over the last few days. Pt states she is not having a hard time bringing up mucous and states that it is now making her SOB. Pt has no other PMHX. 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

## 2025-04-20 NOTE — ED PROVIDER NOTES
"JR Oconnell is a 25 y.o. female approximately 9w pregnant p/w cough.  She reports she has been around sick contacts and started to develop congestion and rhinorrhea.  Progressed to thick sputum that she feels she is unable to cough up.  Nausea without vomiting.  No fevers.  No chest pain or shortness of breath.    [facility]  Medical History[1]    Meds  Current Outpatient Medications   Medication Instructions    aspirin 81 mg, oral, Daily    docusate sodium (COLACE) 100 mg, oral, Nightly PRN    lidocaine (Lidoderm) 5 % patch 1 patch, transdermal, Daily, Remove & discard patch within 12 hours or as directed by MD.    lidocaine-prilocaine (Emla) 2.5-2.5 % cream Topical, As needed    [id number]-dha-epa-fish oil (Prenatal Gummy) 400 mcg-35 mg -25 mg-5 mg tablet,chewable 1 tablet, oral, Daily       Allergies  RX Allergies[2]     SHx  Social History[3]    ------------------------------------------------------------------------------------------------------------------------------------------    /79 (BP Location: Left arm, Patient Position: Sitting)   Pulse 75   Temp 36.5 °C (97.7 °F) (Tympanic)   Resp 20   Ht 1.6 m (5' 3\")   Wt 109 kg (240 lb)   SpO2 95%   BMI 42.51 kg/m²     Physical Exam  Vitals and nursing note reviewed.   Constitutional:       General: She is not in acute distress.     Appearance: Normal appearance.   HENT:      Head: Normocephalic and atraumatic.      Right Ear: External ear normal.      Left Ear: External ear normal.   Eyes:      Extraocular Movements: Extraocular movements intact.      Conjunctiva/sclera: Conjunctivae normal.   Cardiovascular:      Rate and Rhythm: Normal rate and regular rhythm.      Pulses: Normal pulses.      Heart sounds: Normal heart sounds. No murmur heard.     No friction rub. No gallop.   Pulmonary:      Effort: Pulmonary effort is normal. No respiratory distress.      Breath sounds: Decreased breath sounds (2/2 body habitus) present. No wheezing, rhonchi or " rales.   Abdominal:      General: There is no distension.      Palpations: Abdomen is soft.      Tenderness: There is no abdominal tenderness. There is no guarding or rebound.   Musculoskeletal:         General: No swelling. Normal range of motion.      Cervical back: Neck supple.      Right lower leg: No edema.      Left lower leg: No edema.   Skin:     General: Skin is warm and dry.   Neurological:      General: No focal deficit present.      Mental Status: She is alert and oriented to person, place, and time.   Psychiatric:         Mood and Affect: Mood normal.          ------------------------------------------------------------------------------------------------------------------------------------------    Medical Decision Making: Well appearing 26yo F p/w cough and congestion.  Symptoms most consistent with viral URI.  Swabs r/o COVID, influenza, RSV.  CXR r/o PNA.  She is uncertain about what medications she can take for her cold because of her current pregnancy.  Given Robitussin DM here with prescription for home.  Tylenol for pain.  She is well hydrated.  Considered labs, but overall she has no concerning vital sign abnormalities or physical examination findings in which labs are necessary.  Stable for discharge at this time to follow up with her OB.  Return precautions provided.      Independent Interpretations: CXR without focal consolidation    EKG interpreted by me:  ED Course as of 04/20/25 1201   Sun Apr 20, 2025   1009 EKG:  Rate 80  NSR  Normal axis  Normal intervals  No ischemic change  Sinus arrhythmia [AG]      ED Course User Index  [AG] Rigo Darling MD         Diagnoses as of 04/20/25 1201   Viral URI         Rigo Darling MD  Emergency Medicine Attending         [1]   Past Medical History:  Diagnosis Date    BMI 40.0-44.9, adult (Multi)     Chlamydia     History of pre-eclampsia     Hx of gonorrhea    [2] No Known Allergies  [3]   Social History  Tobacco Use    Smoking status: Former      Types: Cigars    Smokeless tobacco: Never   Vaping Use    Vaping status: Former   Substance Use Topics    Alcohol use: Not Currently    Drug use: Not Currently     Types: Marijuana        Rigo Darling MD  04/20/25 [id number]

## 2025-04-20 NOTE — Clinical Note
Della Oconnell was seen and treated in our emergency department on 4/20/2025.  She may return to work on 04/22/2025.       If you have any questions or concerns, please don't hesitate to call.      Rigo Darling MD

## 2025-04-20 NOTE — DISCHARGE INSTRUCTIONS
You have a common cold.  Your x-ray was negative for pneumonia.  Your swabs were negative for infection.  Take Tylenol for pain.  You can use the medication prescribed to help with your mucus.  Follow up with your OB doctor.  Return to the ED for any concerning symptoms.

## 2025-04-28 PROBLEM — O36.5930 POOR FETAL GROWTH AFFECTING MANAGEMENT OF MOTHER IN THIRD TRIMESTER (HHS-HCC): Status: RESOLVED | Noted: 2024-07-23 | Resolved: 2025-04-28

## 2025-04-28 PROBLEM — R73.09 ELEVATED GLUCOSE TOLERANCE TEST: Status: RESOLVED | Noted: 2024-08-21 | Resolved: 2025-04-28

## 2025-04-28 PROBLEM — Z3A.37 37 WEEKS GESTATION OF PREGNANCY (HHS-HCC): Status: RESOLVED | Noted: 2024-08-15 | Resolved: 2025-04-28

## 2025-04-28 PROBLEM — A74.9 CHLAMYDIA: Status: RESOLVED | Noted: 2024-02-25 | Resolved: 2025-04-28

## 2025-04-28 PROBLEM — Z34.90 ENCOUNTER FOR PLANNED INDUCTION OF LABOR: Status: RESOLVED | Noted: 2024-09-24 | Resolved: 2025-04-28

## 2025-04-28 PROBLEM — F41.9 ANXIETY: Status: RESOLVED | Noted: 2023-10-19 | Resolved: 2025-04-28

## 2025-04-28 PROBLEM — F32.A DEPRESSION: Status: RESOLVED | Noted: 2023-10-19 | Resolved: 2025-04-28

## 2025-04-29 ENCOUNTER — APPOINTMENT (OUTPATIENT)
Dept: OBSTETRICS AND GYNECOLOGY | Facility: CLINIC | Age: 25
End: 2025-04-29
Payer: COMMERCIAL

## 2025-06-27 ENCOUNTER — PROCEDURE VISIT (OUTPATIENT)
Dept: OBSTETRICS AND GYNECOLOGY | Facility: CLINIC | Age: 25
End: 2025-06-27
Payer: COMMERCIAL

## 2025-06-27 VITALS
WEIGHT: 222.2 LBS | SYSTOLIC BLOOD PRESSURE: 115 MMHG | DIASTOLIC BLOOD PRESSURE: 78 MMHG | HEART RATE: 83 BPM | BODY MASS INDEX: 39.36 KG/M2

## 2025-06-27 DIAGNOSIS — N93.9 ABNORMAL UTERINE BLEEDING: ICD-10-CM

## 2025-06-27 DIAGNOSIS — Z30.432 ENCOUNTER FOR IUD REMOVAL: Primary | ICD-10-CM

## 2025-06-27 DIAGNOSIS — Z30.011 ENCOUNTER FOR INITIAL PRESCRIPTION OF CONTRACEPTIVE PILLS: ICD-10-CM

## 2025-06-27 PROCEDURE — 58301 REMOVE INTRAUTERINE DEVICE: CPT | Performed by: OBSTETRICS & GYNECOLOGY

## 2025-06-27 PROCEDURE — 99214 OFFICE O/P EST MOD 30 MIN: CPT | Performed by: OBSTETRICS & GYNECOLOGY

## 2025-06-27 PROCEDURE — 99214 OFFICE O/P EST MOD 30 MIN: CPT | Mod: 25 | Performed by: OBSTETRICS & GYNECOLOGY

## 2025-06-27 RX ORDER — AMOXICILLIN AND CLAVULANATE POTASSIUM 875; 125 MG/1; MG/1
1 TABLET, FILM COATED ORAL
COMMUNITY
Start: 2025-01-07

## 2025-06-27 RX ORDER — NORGESTIMATE AND ETHINYL ESTRADIOL 0.25-0.035
1 KIT ORAL DAILY
Qty: 28 TABLET | Refills: 11 | Status: SHIPPED | OUTPATIENT
Start: 2025-06-27 | End: 2025-06-27 | Stop reason: ENTERED-IN-ERROR

## 2025-06-27 RX ORDER — NORGESTIMATE AND ETHINYL ESTRADIOL 0.25-0.035
1 KIT ORAL DAILY
Qty: 84 TABLET | Refills: 3 | Status: SHIPPED | OUTPATIENT
Start: 2025-06-27 | End: 2026-06-27

## 2025-06-27 ASSESSMENT — PAIN - FUNCTIONAL ASSESSMENT: PAIN_FUNCTIONAL_ASSESSMENT: 0-10

## 2025-06-27 ASSESSMENT — ENCOUNTER SYMPTOMS
PSYCHIATRIC NEGATIVE: 0
HEMATOLOGIC/LYMPHATIC NEGATIVE: 0
EYES NEGATIVE: 0
ALLERGIC/IMMUNOLOGIC NEGATIVE: 0
MUSCULOSKELETAL NEGATIVE: 0
RESPIRATORY NEGATIVE: 0
CONSTITUTIONAL NEGATIVE: 0
ENDOCRINE NEGATIVE: 0
CARDIOVASCULAR NEGATIVE: 0
GASTROINTESTINAL NEGATIVE: 0
NEUROLOGICAL NEGATIVE: 0

## 2025-06-27 ASSESSMENT — PATIENT HEALTH QUESTIONNAIRE - PHQ9
SUM OF ALL RESPONSES TO PHQ9 QUESTIONS 1 AND 2: 0
1. LITTLE INTEREST OR PLEASURE IN DOING THINGS: NOT AT ALL
2. FEELING DOWN, DEPRESSED OR HOPELESS: NOT AT ALL

## 2025-06-27 ASSESSMENT — PAIN SCALES - GENERAL
PAINLEVEL_OUTOF10: 0 - NO PAIN
PAINLEVEL_OUTOF10: 0-NO PAIN

## 2025-06-30 NOTE — PROGRESS NOTES
24 yo  with Paragard IUD placed at time of TAB several months ago.  She has had very heavy and painful menses since then.  She would like the IUD removed.  Her youngest is 9 months old.  We discussed possibly trying the LNG-IUD which has many of the same benefits but light bleeding.  She would like to consider that.  Meanwhile she would like to start COCs.  She has never used COCs before.  No contraindications.  I reviewed pill instructions and precautions.  She understands.  Rx sent to her pharmacy.     Patient ID: Della Oconnell is a 25 y.o. female.    IUD Management    Performed by: Rach Luciano MD MPH  Authorized by: Rach Luciano MD MPH    Procedure: IUD removal    Reason for removal: patient request    Strings visualized: yes    Tenaculum applied to cervix: yes    IUD grasped by forceps: yes    IUD removed: yes    Removed without complications: yes    IUD intact: yes

## 2025-07-02 DIAGNOSIS — Z34.80 SUPERVISION OF OTHER NORMAL PREGNANCY, ANTEPARTUM (HHS-HCC): ICD-10-CM

## 2025-07-02 RX ORDER — DOCUSATE SODIUM 100 MG
CAPSULE ORAL
Qty: 90 CAPSULE | Refills: 1 | Status: SHIPPED | OUTPATIENT
Start: 2025-07-02

## 2025-07-16 ENCOUNTER — APPOINTMENT (OUTPATIENT)
Dept: OBSTETRICS AND GYNECOLOGY | Facility: CLINIC | Age: 25
End: 2025-07-16
Payer: COMMERCIAL